# Patient Record
(demographics unavailable — no encounter records)

---

## 2024-10-15 NOTE — REASON FOR VISIT
[FreeTextEntry1] :   [phone] Mom cell  I had the pleasure of meeting with Naila Souza  and her parents, for a follow up appointment.  Introduction:  Initial Consultation Assessment (2020): Naila Souza presented for consultation for concerns for Autism and ADHD. This consultation was performed by phone given COVID-19.  *Autism concerns: The following is reported: -reduced eye contact, reduced response to name -speech delay -reduced shared enjoyment and joint attention -repetitively may watch part of a video -perserverates -some compulsive tendencies - everything has its place, upset if something isn't in its place -unusual visual regard -spun herself around when younger, 'laps' done around the home -holds 2 dolls with her much of the time she is at home -scripts -somewhat of a social interest but struggles with cooperative play -social pragmatic weaknesses -transition difficulties -self-directed  *ADHD: -impulsive -easily distracted -active  *Academics: -below grade level -reportedly past attempts at cognitive assessments were not successful  Overall Naila's presentation is consistent with the following:  *Autism Spectrum Disorder (requiring substantial support) (given that this consultation was not performed in person, a definitive diagnosis will be given during a follow up visit, but we discussed that by report her presentation is consistent with Autism)  *ADHD-Combined presentation. We discussed that in a self-directed individual, self-direction vs. inattention often can be difficult to decipher and that this requires continued monitoring, but a diagnosis is given at this time.  *Neurodevelopmental Disorder: this is reflective of her learning challenges and suspect cognitive challenges, in the absence of robust cognitive testing. Her learning profile and cognitive abilities require continued monitoring and evaluation overtime. Currently her cognitive abilities are unknown.  INTERIM HISTORY:  *2024: __Started Camp program [employer] program at [facility] [place] [facility] (had been there for 7 summers)- did well first few days - last few days - aggressive, pushing, swatting. -were going to try half -days but still struggling -parents have removed her from camp __At home: -no major change, fair behavior at home for Naila although some worsening challenges w/ routines, becoming more of a process - might just sit and ignore, and not become aggressive -Sister left a couple of days prior to behavioral issues worsening -in general ended off school yr and began camp in a good 'zone'  *2024: -stopped camp -returned to ESY program - but then had COVID and ESY almost complete -Returned to [facility] respite program -trialed FXR -back into mode of sleeping late -fair behavior at Respite and ESY, better than in camp  TODAY:  *Endo (2023): -mood changes with menses - can consider seeing a gynecologist to manage -normal A1C, do not have concerns regarding early Type 1 diabetes, antibodies for Type 1 DM negative -should not be at increased risk of DM type 1 on top of fmhx -f/u as needed  Menstrual periods have become more regular since 2024 (first period 2022)and this has been helpful with behavior. Transitioned to MS fairly  Current Regimen: -Prozac 20mg - increased from 15 around Sept -Guanfacine ER 4mg at night - around 6:30pm - no impact on sleep -Guanfacine 1mg prior to bed (on hold) -MPH 2.5mg (started around beginning 2024, later stopped, restarted when began struggling in camp)- stopped summer 2024 -FXR 5mg: only for a few days and a lot of other changes correlating. No major benefit. No major SE - 10/2024 - haven't retrialed     *MPH 2.5mg: __2024: -take in morning -seems to keep her awake more in school -and seems calmer in general __10/2024: -perhaps tried up to 5mg, didn't try long enough to get a sense of response  *Guanfacine 1mg: __2024: -had been trialing to help with sleep, seemed to help but noted increase in tic like behavior so stopped it -tic like beh was squinting her eyes, hand over eyes and shaking fingers, repetitive exp noises - at times can interrupt it, other times got stuck on it and didn't want to be disturbed -seems to have decreased in frequency overtime __10/2024: -havent retried prior to bed  *Fluoxetine 10mg: -benefit noted -less scripting, less defiance, speech is clearer, more engaged when speech with her, quieter, calmer, more accepting of being redirected -more focused and engaged with activities __2023: -trialed 15mg but then returned to 10mg as noted tic like behavior and worried might be related __2023: -retrying 15mg - no major difference __2023: -unclear level of benefit, likely somewhat beneficial -correlated with recent increase in constant motion - in past has also had similar behavior but more recently more prominent - unclear if SE of the medication __2024: -tried weaning off down from 35, got down to 10mg but more emotional dysregulation, now on 15mg and seems to be fair. On 35mg more 'in a fog' -benefit with 15mg of Prozac __2024: -15mg __10/2024: -In Sept increased to 20mg  *Guanfacine ER 1mg: -continued outbursts and behavioral issues -Few days ago (as of 22) increased to 2mg - calmer, happier, more engaged - doing better at [employer] -still rigid -towards afternoon more impulsive, more impulsive eating -last night (22) diff falling asleep/woke up during the night __2022: -taking Guanfacine ER 2mg at night -less defiance, less outbursts __2023: -continuing 2mg nightly __2023: -after last visit increased to 3mg - unclear if any major difference __2024: -Guanfacine ER 3mg - tried going down to 2mg but worse sleep, so returned to 3mg __2024: -3mg _: -recently increased to 4mg - no major SE and no increased benefit  *Involuntary movements and vocalizations: __Neck twitch: a few beats of ear to shoulder - can happen when watching video, more common if scripting -more common since sick mid 2022 -a few times a day -doesn't seem to bother her although school thinks perhaps uncomfortable for her -no major pattern -wax and wane - school noticing more __2023: -shuffling gait -happening throughout the day, a few minutes at a time, variable - jerky movements, intermittent vocalizations and shuffling gait - more common if not busy, more common when sitting around or watching video -behaviors impact her from doing behaviors she wants to participate in or even eating -taking longer to eat due to the jerking/vocalizations -behaviors don't seem to bother her clearly although parents question if the grimacing is an expression of discomfort __2023: -significant decrease in these behaviors - evaluated by Neuro __2024: -involuntary movements have significantly decreased -shuffling gait has stopped -walks more fluently but covers her eyes __2024: -tic like beh was squinting her eyes, hand over eyes and shaking fingers, repetitive exp noises - at times can interrupt it, other times got stuck on it and didn't want to be disturbed -seems to have decreased in frequency overtime   Communication: __2024: -may verbally protest -often speech not very clear -Speech very clear when Ipad not working and asking for assistance  *Rigidity/rituals/atypical behaviors/RRB: -variable, inconsistent -very rigid how she does things and how she places things -watches video clips repetitively -arranges dolls in particular fashion -set rituals __2024: -cycles of stereotyped behaviors -vocalizing and gently putting finger tips to right eye -vocalizing no no no no -periodic episodes of internal scripting  *Behavior: -zoning out more -more episodes of aggression -more scripting -less engaged -noted at home and in school -more defiance -harder to focus and redirect -Teachers reportedly note (2021): easily distracted -internal/external, scripting throughout the day, many ritualistic behaviors, if changes in routine -defiant, aggressive, property destruction, visual cues seems to help redirect her, throughout the school day, needs token economy system, behaviors interfering her progress, working on group skills. When focused makes more progress. Academically behind. Messy handwriting. Needs a lot of repetition -Home: does laps around the house, was cooperative during recent physical, scripting, directable at times, at times gets more emotional - gets frustrated at times but short lived. Talks under her breath/in a whisper. Slow w/ morning routine, but tries following routine. __2022: -worse behavior with Vyvanse __3/ School: -still diff focusing -fewer outbursts but still present -body jerking, vocal grunting - hard to get through -jerky movements - parent hasn't generally seen -giving more rewards more frequently -more snacks for sensory, more movement breaks __2022: -some improvement over recent past __2022: -certain sequencing she needs to complete - walk, stop, walk back, go forward again -internal and external scripting - in her own world, may not always respond -during scripting may get louder, more emotional, hard to calm down -Camp: had been in an inclusion group prior years with aide for SPED campers -but this year more problematic, needs more supports-- --now in a self-contained group __2022: -some improvement w/ Intuniv 2mg __2022: -some improvement w/ Intuniv and Prozac although some continued physical resistance in school and at times at home __2023: -continued scripting -may physically indicate 'no' 'go away' -if frustrated scripting becomes louder -overall behavior has been fair/manageable __2023: -increased impulsivity - got flour everywhere in kitchen tried doing her old laundry - at times impulsivity related to her wanting to be independent -scripting: becoming more internal -calm moments, more energetic moments __2023: -continued significant challenges with 'constant motion' (waxes and wanes, was present prior to SSRI trials as well, but has been worsening), internal and external scripting interrupting her fxing and related inattention, behavioral outbursts, aggressive behaviors (more common when around her period) -Constant motion - pacing, circles room -Gets stuck scripting/yelling/stimming - with time usually calms after 20-30 minutes - AT home more manageable. At camp/school more challenging to give her time/space. -program in her head she needs to go through before can move on - like in a trance Camp: -some screaming episodes 20-30 minutes, at times aggressive - may push/swat others __2024: -overall behavior has been better than the past, although persist being very challenging *School: -no recent aggressive episodes -some ritualistic behaviors -sleep is largest issues __2024: -participating more in class -no recent aggressive behaviors -less sleeping in class - sometimes seems to be task avoidant at times __10/2024: -Becoming somewhat more aggressive, may swat peers or staff -Respite program: needs time to deescalate, may need to remove from peers when gets upset -Ipad: -stuck on having it with her all the time -in school has to work for the Ipad - perseverates (during visit if took time loading increasing became upset, yelling -'its NOT working!!!!' and repeatedly tapping it, and hard to console)   *Sleep: -since Dec worsening/less consistent sleep -diff settling down for sleep - wouldn't fall asleep until 1 am -some nights asleep ~9pm -Wakes: if goes to sleep later diff to wake in the morning wants to sleep until 11am, in general if goes to sleep regular time wakes around 6 -2 instances of bedwetting recently - although may have been attention seeking - was awake trying to fall asleep -Melatonin 5mg - unclear if it helps -Most nights 1-2 nights up a little later than in general __2023: -more diff going to sleep, wants to stay up and play __2023: -prior to sleep issues ?related to period perhaps - some nights asleep around 9 and some nights around 1am - worsened during period __2023: Sleep improved as more active over summer - off and on as well __2024: -bedtime routine around 9:30/10pm in bed - routine takes time as she wants to do things in her own time/way - process takes a long time. Transitions between things are very time consuming -Majority of wk not asleep until Midnight to 1-2:30AM. Most mornings have to wake her around 7am, other times she wakes on her own earlier and then takes a nap for 3 hours in school. On wknds may sleep until 10-11am. - Due to slow going in morning often parent needs to bring her to school and she gets to school late -Stopped melatonin - no major differences __2024: -likes sleeping late - sometimes impacts her getting to school on time -sleep times vary __10/2024: -hard to get up in the morning -falling asleep around MN most night, sometimes 3am, waking 9-10 am and getting to school late, hard to get up in the bed   *Social: -enjoys being around her peers -takes more to engage her than in the past  *Anxiousness: -when on Zoloft somewhat calmer -a little more engaged -seems more overwhelmed at times, talking in a low tone, reduced EC -seems to be more of an issue recently __2022: -some improvement with Prozac  *Staring off: (2022) In School: There have also been instances where she stares off to the side. Her eyes move to the side and remain fixed and she does not blink. Additionally, she does not respond to or track visual stimuli brought near her such as a waving hand. Home: -havent noticed recently __2022: none recent  Private services: -began working with  retired [employer] program - has been helpful -music therapy - stopped -Private SLT - still attends -Respite services  *School input (24): We've noticed an increase in "no please!" which she uses to cover a lot of "annoying to her topics" - it's her way of saying "be quiet!", "too close", "I don't like that!" etc. She has been more willing to be redirected to a more specific statement that targets the external activity she doesn't like.  We've seen a great decrease in aggression - 1 swat on  and also she threw something on the floor. We have not seen anything else in terms of aggression in months!!!!!!!! Such great progress!  We notice a bit more of ritualistic behaviors the week before she gets her period. More singing, more "dancing in her seat" kind of thing, but nowhere near the amount she was doing in the beginning of the year. She transitions beautifully in the hallway and to classes. Biggest issue we have is the sleeping, but other than that she is an absolute delight! ***  SCHOOL HISTORY/DEVELOPMENTAL SERVICES:  Academics: -below grade level, closer to K level, starting to read, 12-15 sight words, can do basic addition -hard to engage to sit and work with her  *Report Card ([id number], Grade 1): -mostly 1s and 2s  School: [employer] Grade: 7th Services: -8:1:3 -SLT -OT -Social group  Past school: School Name: [employer] Grade: 5th Services: *IEP -Classification: ASD -8:1:3 -SLT: individual and small group -OT -Social group -perhaps some mainstreaming -Chorus - likely w/ an aide -Behavior intervention services - [employer] involved in her programming - monitoring and tracking her behavior in class-([facility] trained therapist/teacher in place/[facility]) -Parent training/counseling -ESY (although attended a summer camp program which she did well in ) -NO testing accommodations Comments: -demonstrates global developmental delays impacting academic progress -thrives on classroom routine -does well with rote questions -requires focusing prompts to answer questions in group -working with her on what quiet means and will incorporate times that acceptable to script and times were it isn't -responding to social greetings and farewells are emerging with visual support and models -establishing eye contact is an ongoing goal -needs consistent modeling and prompting to interact with her peers during social times  __ 23: School report: Naila arrived at school today calm. Around 8:50 we began transitioning down the hallway to go to the bus for our middle school visit. She began to engage in negative vocalizations that quickly turned into screams. She then laid on the bench in the front carl and continued to scream and aggress toward anyone that came in reach of her. We were able to get her calm enough to comply with standing and walking. We began slowly walking down the carl with many stops along the way (I will describe this behavior below). She continued to scream during the walk but was not aggressing. Once we got to the turn in the hallway, the principal joined us. She reminded Jd that she is okay and we are all here for her. Naila began to escalate in her screams and begin to aggress. Most of the aggression at this point was towards the principal but would switch to any person who tried to block or redirect her. The aggression included swats, hits, pushes and kicks. She was so escalated we needed to help her walk into the OT room to deescalate. Once there, she laid on the floor and we got her a pillow and a blanket. She was crying so hard she was no longer making sounds. We shut off the lights and positioned ourselves close enough to her, so she knew we were there to support her but far enough away to give her the space she needed to come back down. Once semi quiet, she earned the iPad and watched it for about 6 minutes. The timer then went off and she earned tokens for completing instructions. She was able to sit up, stand up, sit in a chair and walk to class where she earned the iPad again. From this time until approximately noon (about 2 hours), Naila was on edge with some screams and aggression but was able to complete some work. Unfortunately, around noon she began to escalate again. This episode took place in the classroom and she sat on top of her desk which was unsafe. We directed her into the work room in the back of the classroom where it was quiet and also where she would not disturb her classmates. At this point the magnitude of the aggression was very high. Kailyn and SHAHZAD were in the room with her with her token board and reinforcement. We tried a variety of methods, reinforcing moments of quiet, telling her she is safe and we are here to support her, ignoring behaviors, blocking and redirecting, as well as, showing her the reinforcement and providing almost immediate access for any type of compliance. None of these methods were successful. She aggressed continuously for almost 50 minutes. For reference, I was able to take 1 minute of frequency data and there were 46 aggressions in one minute. Aggression included hits to our bodies and faces, kicks to our bodies and wrapping her arms around our necks and slamming her body into us. At one point, we stepped out of the room to give her space and she continued to hit the walls and screaming while looking at us. She also went through a period of hugging me in a consoling way then stepping back and hitting me in the face then returning to hugging. This sequence happened 6 or 7 times. Eventually, we were able to guide her out of the room and back to her table where she was able to earn reinforcement and transition to lunch. The transition took about 26 minutes, and she did not make it to the lunchroom. Instead, in order to ensure she got to eat, we stopped in a one of the teacher's quiet small office spaces which [facility] Once inside she was able to sit calmly and eat her lunch. After lunch, iPad was terminated, and she began to work and earn on her regular schedule. It was then time to transition to chorus, she made it to approximately the OT room (similar distance as the previous transition) in about 23 minutes. It was at that point that we realized we needed to turn back or she would not make it back to class in time for dismissal. We return walking with her continuously stopping and scripting. Once we were in view of that same quiet small office space the TA pointed and said "Jd, look we are going back to where you ate lunch" and she very quickly walked straight to the room and sat down. She was then able to sit and work then complete her homework schedule. She walked to the classroom packed up and went outside with a positive affect. Kailyn spoke to mom and came back into the school. I saw people at the door and heard Jd yelling. I ran down and went outside. Kailyn notated that she was walking to the front of the building and saw her starting to engage in behavior so she went back. Upon my arrival there were 4 staff there and mom monitoring the street due to  time and her safety. Kailyn was blocking her head as she was laying on the cement. aKlina, classroom SLP, ran inside to get a bean bag to put under her head, again for safety purposes. We directed the other staff to stand out of sight but to continue to monitor the road and people walking by. She eventually stood up and with many prompts walked to the car which was parked in the back of the building. The whole process took about 45 minutes. The scripting behavior I am referring to throughout the summary is a newer behavior. It is a specific script where she stops, puts her hands to her mouth likes she is yelling and produces an unintelligible script, lifts her hands above her head as if she is yelling then on her hips like she is angry. She then spins around and looks at the floor. Sometimes she will complete this one time and continue walking/ working. Other times she has to complete this 6 or 7 times in a row before getting started.  *School note (2023): -recently demonstrating significant increase in vocalizations/involuntary movements leading to a loss of fx in school -for past 2 months these have been debilitating for Naila -do not appear behavioral or sensory in nature - sensory/behavioral plans have not been successful -behaviors occurring in all settings/all teachers, preferred and non-preferred activities, resting times = do not appear behavioral or self-stim in nature -she tries to work through these involuntary mvments to complete her work or participate in activities she enjoys and she cannot - gets frustrated if times runs out b/c wanted to finish preferred task but body isn't letting her -quick head tilts, shoulder shrugs, freezing during a mvmt, shuffling gait, screaming, vocalizations, kicking, muscle twitches - data shows occurring every 3-20 seconds -diff with FM tasks, diff eating/opening packages, cant participate in gym activities she could in the past, diff participate in new learning activities -issues since ~January  *22 Behavioral Consultant provided info Overall, there has been a decrease in the upset/"emotional" episodes that typically included aggression, property destruction and screaming. The week before she was sick there was an increase in screaming and aggression. There is the possibility that it may have been due to not feeling well ahead of testing positive. She continues to engage in high rates of self-stimulatory behaviors and non-contextual vocalizations that interrupt her ability to engage in academic tasks. These behaviors occur throughout the day and occur during leisure and preferred activities as well. They are not restricted to times of academic tasks or non-preferred demands. We continue to see non-compliance during times of academic tasks and demands. These can present as task avoidance, but also includes times when we have difficulty refocusing and redirecting her back to tasks. There are times that she appears as "stuck" within her self-stimulatory and scripting behaviors and does not appear aware of her surroundings or directions being given. When she presents as more "stuck" her motor movements appear to be brief and jerky (i.e. a quick extension and retraction of her arm out in front of her) and her vocalizations are generally more sound or grunt based versus intelligible words or tv scripts. There have also been instances where she stares off to the side. Her eyes move to the side and remain fixed and she does not blink. Additionally, she does not respond to or track visual stimuli brought near her such as a waving hand. please let up know if there's any additional information or anything we can provide more examples of or elaborate on  In past: Goes into 12:1 for a morning meeting, and mainstreamed for some activities Does well with structured routine. Fairly independent in some settings. Mainstreamed largely for socialization opportunities. Some mainstreaming with an aide to help with games, sharing and conversation - -may need prompts to respond or initiate a greeting.  : School Name: [employer] through [employer], 2 years Grade:  Services: -1st year in  - 10 students -2nd year in  - 8:1  *Behavioral Consult Progress report [facility] for Autism ([id number], 2019): -improving ability to tolerate change in routine, redirect, and interrupt preferred play -working on working quietly   *[facility] Speech/Language Hearing clinic (treatment plan 6:6-6:8yo yo, ): -working on functional communication, play skills, Pragmatic skills, answering 'wh' questions  *Private Services: -speech therapy at [facility] intermittently -Music therapy -Havent pursued private CHASITY services  *Teacher intake form (2021, 3rd grade): -Self-contained, OT, SLT -requires token economy to keep motivated and on task -seatwork: needs teacher prompt, w/o support work is illegible or incomplete -Academics: failing -Reading: = K level, Math = K level -Social: doesn't seek peers/adult relationships, will sit and parallel play but requires a lot of teacher support to interact appropriately  *Teacher intake form (3/2020): -self-contained, 7 students, multiple other staff, K-2 class, in 2nd grade -SLT -OT -Beh intervention services -highly benefits from CHASITY / discrete trials -thrives with routines, follows most directions -difficulty with changes in routine, generalizing skills, constant refocusing prompts -token board for motivation and on task behavior -Social: peers from Gen ed class seek her out, her interactions with these children are adult led, most of the time she requires prompting to respond back/engage appropriately -recently developed a sense of humor -Academics: below average - 2 or more yrs below grade level -Reading: not retaining learned words or generalizing words across difft materials -Math: K level -Writing: cant spell without visuals, needs some guides to stay on the line -Seatwork: inconsistent - at times needs significant prompting - other days - still requires prompting but not as much   PREVIOUS ASSESSMENTS/SERVICES:  Early Intervention: no services  Private speech therapy around 2 years old.  Earlier evaluations: reportedly wasn't classified as having autism  CURRENT FUNCTIONING/HISTORY OF PRESENTING CONCERNS:  *********AS noted during her initial consultation****   Concerns noted on our intake paperwork include: -poor speech/communication and social skills -makes limited eye contact -bright, comprehends more than she says verbally -difficulty interacting with others- jonathan. verbally - likes to be around other children but mostly parallel plays or needs prompting to interact verbally -she understands much more receptively -she can give appropriate responses with visual and verbal cues -lacks patience and can become frustrated when things don't go her way -responds best to routines/structure  Today's concerns: -autism concerns -ADHD concerns  Concerns: -limited eye contact -difficulty socializing -repetitive movements -perseverations -some compulsive behaviors -everything has a place - need to move something back if not in its own place-table needs to be placed in an exact manner -puts toys exactly / same position and location as she found it   Language/Communication: -Shared enjoyment/joint attention: likely decreased -turns to others for help -Response to name: better now than in the past, reduced in the past -Eye contact: good with requests from her, other times reduced or needs prompts -able to follow multiple step instructions -able to speak in sentences - receptive seems stronger than her expressive language, sometimes speaks in clear sentences, other times less well developed -often scripting  Behavioral: -ADHD: a number of concerns, impulsive, highly distractible, may need multiple prompts -frustrated easily at times -improving with sharing and transitions -still issues with calming when upset, fewer meltdowns than in the past  Emotional: -anxiousness/resistance at doctor's office.  Social/Play Skills: -scripts frequently when playing -play somewhat appropriate - demonstrates pretend play with play dolls- has 2 dolls in her arms or near her often when she is home (not in school) -some appropriate play with toys at home -demonstrates some pretend play with kitchen set -difficulty taking turns - timer seems to help -some social interest in peers - on her terms -very connected to her older sister - responds well to Soheila her older sister - somewhat of a jokester and can  on jokes quicker than her older sister  Atypical Behaviors/Sensory: -hand flapping: rarely -in past: spun herself around in circles -walks around in circles/laps around the house -may watch same scene of a video repetitively - may want parent to repeat something she says in regards to the video - may repeat saying it until validated -Focus on wheels: no -no toe walking -stands close to TV -difficulties with changes in routine and transition issues -some visual peering/unusual visual regard - usually with TV -generally no visual inspection -play can often be in a set pattern - can get upset if someone wants to play in a different manner -may chew banana and then spit it out into garbage due to texture issues -tactile: sensory seeking - when watching TV may touch the screen, no major issues with clothing, likes deep hugs when upset, often makes use of sensory room in school -may repetitively watch the same show in the same way -often does 'laps' around the house - jonathan. when anticipating something -Loud noise: generally no issue  Motor Abilities: -fair  ***********   *MEDICAL HISTORY:  Current Medications: -Intuniv 4mg at night -Prozac 20g -Guanfacine 1 mg at night - on hold -MPH 2.5mg in AM - stopped -Claritin prn  Medication History: - swallows  *GIOVANNY 10 mg (2020-~2021): -10mg - unclear duration / benefit/SE -~2021 - trialed 20mg - unclear effects/SE - correlated with change in her usual routine. Seemed that worsened sleep, perhaps made her less interested in food, perhaps more scripting/rituals and more common meltdowns jonathan in afternoon around time medication thought to perhaps be wearing off --- although behavioral issues also noted on days that wasn't taking the medication so unclear if she truly failed GIOVANNY/stimulants - may trial again in future. Perhaps was quieter at times in school w/ medication  *Guanfacine 1mg (21 -2022, ~2022 restarted and later STOPPED 2022, 2024- ): -to trial pill 3 X a day -2021: 1mg BID - clear benefit noted, calmer, more cooperative, more engaged -2021: to trial small increases -2022: stopped, and shortly after increased Lexapro to 7.5mg - more active, impulsive, eating more, some more diff falling asleep -2022: restarted and later stopped -~2022-2023 - trial 1mg before bed as sleep aide, didn't help - stopped -2024: to retry as sleep aide -2024: helped with sleep 1mg at night -but parent concerned seem to correlate w/ new stim/tic like behavior and stopped, might retray  *Zoloft 25 mg tab (2021 -10/2021 stopped): -seemed somewhat zoned out, at times somewhat sleepy in class/drift off, napped when usually didn't nap -once stopped Zoloft - more scripting, more circling around, more repeating, repetitive, scripts the same story, moving hands in set way, gets emotional at times and yells out - tab  *Concerta 18mg (~-10/2021 -for a few days and stopped, trialed 3 days - can consider trialing again at some point): -more meltdowns, on and off throughout the day, emotionally reactive/less easily redirectible -aggressive, yelling, agitated, hit, more physical -Duration: lasted most of the day, less than 12 hours  *Vyvanse 10mg (2021 -2022): -more outbursts, throughout day but mostly as wearing off -lasted a few hours, somewhat calmer -more scripting, more distracted by internal stimuli -Sleep: no impact -Diet: slight impact - less snacking  *Lexapro (2022-2022): -to trial to address outbursts, scripting, and behavioral challenges -2022: trialing 7.5mg - some decrease in internal scripting noted, seems happy -3/2022: increased to 10mg -2022: trial increase to 15mg -2022:-weaned off, as weaned off more impulsive behavior, more scripting, increased appetite, more rigid. Parents stopped as had continued with emotional outbursts - meltdowns in school, internal and external scripting. Not helpful enough and continued outbursts.   *AXR 5mg(3/2022-~2022): - unclear impact, some diff with sleep but manageable, perhaps fewer outbursts -3/31/22: trialed increase to 10mg - 'seemed off' and returned to 5mg -2022: trialed 10mg - more outbursts and scripting - stopped.   *Guanfacine ER (2022-): -2022: taking 2mg with noted benefit __2023: 3mg nightly __2024: tried going down to 2mg but worse sleep, so returned to 3mg __10/2024: recently increase to 4mg - no increase in benefit or SE  *Prozac 10mg (~2022-): -benefit noted -more engaged, less scripting, calmer -2023: trialed to 15mg - but noted tics and returned to 10mg. Benefit noted (discussed tics likely unrelated) -2023: parent increased to 15mg again -2023: 30mg daily -2024: reduced to 15mg - at 15mg beneficial, at 10mg insufficient benefit -2024: 15mg __10/2024: recent increase to 20mg   *MPH 2.5mg (~2024-): -seems to be helping her stay awake in school  *FXR 5 (2024-stopped after few days): -tried for about one wk -no major difference -perhaps scripting more   Allergies: -none  UPDATED PAST MEDICAL HISTORY: There have been no recent major medical changes.  Birth History: -born FT, 6.13lbs -regular delivery  ROS: A 10-point review of systems was performed. No concerns regarding vision and hearing. No cardiovascular, respiratory, gastrointestinal, renal, endocrine, neurologic, musculoskeletal, or dermatologic concerns.  *[place] evaluation Dr. Wilver William - Chief developmental pediatrics: worked on ways to address her behavior: reportedly impression was that Naila didn't have autism.  Summer 2020: had episode of passing out - hospitalized and evaluated by Cardiology and Neurology. Neurology considering/ordered brain MRI. No seizure activity found  *20: -Reviewed inpatient Cardiac Consultation note (hospitalized following episode of syncope) *Cardiology (2020, Vee Huff and Elida Golden): -no cardiac contraindications for starting ADHD medication  Neurology (): -EEG performed -Tic like behaviors have improved/lessoned -MRI normal  Audiology (2-3 yo, Dr. Osborn): hearing thought to be normal, MT placed  Vision: none  Hospitalizations/ Surgeries: -MT placed   FAMILY HISTORY: Her father has a MA and is an . He received Speech services when younger. Generally healthy. Her mother has a MA and is a . She received Speech services when younger. Has type II diabetes - perhaps Type 1. Thyroid issues. She has a sister (Soheila, 2007). Soheila is also followed in our center  There is an extended family history of: -ADHD, learning problems, speech delay, borderline range of intellectual functioning: sister -Bipolar: Paternal uncles, PGM -Schizoaffective: Paternal uncle, lives in a special group home/residential program, also "simple-minded" - he reportedly trialed Prozac and it made him psychotic - requiring hospitalization - improved once stopped -Some easy distraction: father -type II diabetes: maternal side of family -heart murmur: mother, father, and other family members -Fatal Cardiac arrest: uncle, was living in adult independent residence, 48 yo  There is no history of heart rhythm problems or of sudden death.  SOCIAL HISTORY: -lives with parents and sister  PE (23 in person): -well appearing -S1S2 RRR -CTA b/l -FROM of extremities -no consistent muscle rigidity, no clear cogwheel rigidity -no clonus appreciated -fair tone and strength -EOM appeared intact __10/2024: -S1S2 RRR  Observations:  (Tele 21): -calm during visit, frequent verbalizations to herself, holding her hand up with perhaps visual inspection, stopped and greeted me with prompt, pacing around the home, cooperative (Tele 3/31/22): -calmer and more cooperative appearing - mother called her over and she complied, and when prompting said hello with associated EC (in person 2023): -frequent almost barking like vocalizations, and other noises while in waiting room - less frequent but still occurring intermittently during 70 min time in room -neck twitching, shoulder to ear, leg movements, face grimacing - also myoclonic in appearance - also occurred more freq in waiting and less common when involved w/ activity in office -intermittent shuffling gait/pause when walking and then continue walking more fluidly -when walking from waiting room, in office able to walk back and forth across room when requested fluidly -mostly calm during visit and involved in playing with a bin of toys - involuntary movements and vocalizations significantly decreased for a good portion of the visit - for around ~40 minutes few if any were noted, but as visit progressed were some episodes of these movements and vocalizations - usually lasting less than 30 seconds -crayons fell on floor - she wanted to pick them up and put back on table -one toy that was missed when had put away toy bin - despite redirection she went around doctor to put back in bin - seemed driven to do so -answered some questions - yes/no when given options of what to play with -cooperative with vital signs and exam ( 2024): -social rapport not established -intermittently gently touching eye with fingers and vocalizing, intermittent repetitively saying no -self-directed   * LABORATORY/TEST RESULTS/DEVELOPMENTAL ASSESSMENTS:  ***Testing as recorded in her IEP: (2018): __Goldman Fristoe Test of Articulation 3: -Sounds in Words: 69 __Expressive One Word Picture Vocabulary Test-4 (EOWPVT-4) (SS): 14 % __Receptive One Word Picture Vocabulary Test-4 (ROWPVT-4) (SS): 90 __Preschool Language Scale-5 (SS)(SD of 15): -Auditory Comp: 67 -Expressive Comm: 64 -Total Language: 63 __ABAS (Adaptive Behavior Assessment System-III) - Teacher: -General Adaptive Composite: 1 % -Agyle Chuy Tests of Achievement IV (SS) (SD = 15): -Many scores noted as "Not scorable" -Letter Word Identification: 84 -Passage Comprehension: 45 -Reading : 65 -Broad Readin -Basic Reading Skills: 82  -Applied Problems: 51  -Spellin -Broad Written Language: 58 -Basic Writing skills: 84 -Written Expression: 65  Cognitive assessment: reportedly attempted in the past but unable to complete testing secondary to behavior.  *Psychology Eval (2.10 yr): -difficult to engage -very short attention span -looked at herself in mirror for 3 minutes -difficulty cooperating with requests -inconsistent eye contact -often ignored those around her and needed support to engage and attend -FSIQ could not be obtained, non-compliant   (3/2020) __Child and Adolescent Symptom Inventory-5(DAVID-5):  Informant: Parent -inattention:  -hyperactivity/impulsivity: 3/9 -oppositional and defiant behaviors:  -feels compelled to perform unusual habits: often -makes vocal sounds for no apparent reason: often -social deficits/restricted and repetitive interests/behaviors: a number endorsed No other significant symptoms of any emotional or behavioral disorder  Informant: Teacher -inattention:  -hyperactivity/impulsivity:  -oppositional and defiant behaviors:  -Very often: -feels compelled to perform unusual habits, rocks, makes vocal sounds for no apparent reason -social deficits/restricted and repetitive interests/behaviors: many endorsed No other significant symptoms of any emotional or behavioral disorder Comments: -overall pleasure to have in class, happy girl -has been presenting with more anxiety compared to last year -repetitive movements - rocking, vocalizations, scripting - and rigidity - often impede her ability to learning and retain info, as well as generalize skills -thrives on routine/structure - if routine changed - can have tantrum and be difficult to redirect - screaming, crying, some staff aggression  [place] Assessment  -Informant: Caregiver on Intake Form -Inattention:  -Hyperactivity/Impulsivity:    (2021) Guanfacine 1mg BID [place] Assessment: -Informant: Teacher -Inattention:  -Hyperactivity/Impulsivity: 3/9 -Academics: problematic.

## 2024-10-15 NOTE — PLAN
[FreeTextEntry3] : 1. Follow up is scheduled in ~1 month  2. Medication:  *CURRENT: -Intuniv: Current 4 mg - return to 3mg as no increased benefit reported on 4mg -(previously discussed  to trial splitting into 2mg in the PM and 1mg in the AM in hope that will be less sleepy in the morning and more willing to get up for school - don't believe parents tried this) -Fluoxetine 20mg: continue -try increase to 30mg and then after 2 wks if needed/tolerating to 40mg. If fails to wean and consider trialing alternate such as Zoloft -Short acting Guanfacine : can retry 0.5-1mg at bedtime to see if assists w/ sleep initiation - unlikely related to ~4/2024 recent new tic/stim like behavior -MPH 2.5mg in AM - Currently on HOLD - in past seemed to help her stay awake in school. Can continue trial and can trial increases to 5mg or BID dosing if needed/helpful -FXR 5mg: Previously noted can retry in future once back to usual routine - can trial increase in 5mg depending on response/SE. Monitor scripting. If unavailable trial Quill[place]w ER if covered. Target: impulsive behaviors/ADHD sxs/behavioral issues - Parents haven't retried -ABILIFY - 10/2024 -discussed Abilify indicated and is an option. Parents hesitant and not interested in a trial at this time. Of note fmhx of Diabetes (Naila has been evaluated by Endo) and hx of atypical movements while not on atypical although these movements have decreased significantly recently  Additional options -Discussed addtl options such as using MPH short acting once or BID, trialing increase in Intuniv, trialing Abilify. As has generally been doing better wont trial Abilify at this time.  1/2024: -evaluated by Endo (Note - PMD provided small dose of Xanax to help with blood draw, parents tried [address] and at [facility] ph[facility] w/ poor experiences)  1/2024: -discussed trial of Abilify can be considered overtime and may target a number of Naila's challenging behaviors -While fmhx of DM does per her at increased risk with atypicals, recent Endo labwork is reassuring to a degree -if atypicals started may be challenging monitoring for atypical movements as a SE  Previously has discussed that Child Psychiatry consultation and ongoing care (tasked SW to assist - direct referral to [facility] if needed) may be beneficial. 1/2024: is doing better currently than in the past __10/2024: -[facility] info provided. Direct referral to [facility] being made  3. OPWDD: submitting application  4. Home CHASITY: discussed looking into  We discussed effects/SE of: - Stimulants (for her behavior and ADHD) -Alpha agonists (for behavior and ADHD) -SSRI's (for her anxiousness, rigidness, behavior, easy frustration, rituals, scripting).  [email or web address]_[email or web address]/[email or web address]  [email or web address] - ADHD  5. Sleep: __1/2024: -significant challenges - to trial short acting guanfacine as sleep aide -recc scheduled in Sleep clinic eval in case needed __10/2024: -if upcoming med changes aren't helpful Sleep medicine consult can be considered  Previous Recommendations: *Autism reccs *ADHD reccs *Social skills groups  It was a pleasure to work with Naila and her family today. Please do not hesitate to contact Dr. Vasques at [phone] with any other further questions or concerns.

## 2024-11-14 NOTE — PLAN
[FreeTextEntry3] : 1. Follow up is scheduled in a few wks  2. Medication:  *CURRENT: -Intuniv: Current 3 mg -continue (no increased benefit on past trial of 4mg) -Fluoxetine 30mg: continue. If needed consider increase to 40mg. If fails to wean and consider trialing alternate such as Zoloft -Short acting Guanfacine: Continue 1mg prior to bed for sleep initiation -MPH 2.5mg in AM: recc retrialing given Motivation/slow going issue. Give first thing in morning. Can consider using multiple times daily if good response to retrialing XR -FXR 5mg: Previously noted can retry in future once back to usual routine - can trial increase in 5mg depending on response/SE. Monitor scripting. If unavailable trial Quillichew ER if covered. Target: impulsive behaviors/ADHD sxs/behavioral issues - Parents haven't retried -ABILIFY - 10/2024 -discussed Abilify indicated and is an option. Parents hesitant and not interested in a trial at this time. Of note fmhx of Diabetes (Naila has been evaluated by Endo) and hx of atypical movements while not on atypical although these movements have decreased significantly recently  Additional options -Discussed addtl options such as using MPH short acting once or BID, trialing increase in Intuniv, trialing Abilify. As has generally been doing better wont trial Abilify at this time.  1/2024: -evaluated by Endo (Note - PMD provided small dose of Xanax to help with blood draw, parents tried [address] and at [facility] phlebot[facility] w/ poor experiences)  1/2024: -discussed trial of Abilify can be considered overtime and may target a number of Naila's challenging behaviors -While fmhx of DM does per her at increased risk with atypicals, recent Endo labwork is reassuring to a degree -if atypicals started may be challenging monitoring for atypical movements as a SE  Previously has discussed that Child Psychiatry consultation and ongoing care (tasked SW to assist - direct referral to [facility] if needed) may be beneficial. 1/2024: is doing better currently than in the past __10/2024: -[facility]. Direct referral to [facility] being made __11/2024:  -direct referral offered. Parent declined at this time  3. OPWDD: submitting application  4. Home CHASITY: discussed looking into  We discussed effects/SE of: - Stimulants (for her behavior and ADHD) -Alpha agonists (for behavior and ADHD) -SSRI's (for her anxiousness, rigidness, behavior, easy frustration, rituals, scripting). [email or web address]_Themes/[email or web address]/Docs/resource_[email or web address]/[email or web address]/Autism_Spectrum_[email or web address]_Parents_Medication_Guide.[email or web address] - ADHD  5. Sleep: __1/2024: -significant challenges - to trial short acting guanfacine as sleep aide -recc scheduled in Sleep clinic eval in case needed __11/2024:  -discussed shifting bedtime as tolerated slowly earlier hopefully leading to waking earlier and getting to school earlier -if upcoming med changes aren't helpful Sleep medicine consult can be considered -noted FMLA may be appropriate  6. Home CHASITY: __11/2024:  -if needed contact for brief visit for [facility] -may be helpful, can work on sleep and behavior,  from [employer] Previous Recommendations: *Autism reccs *ADHD reccs *Social skills groups  It was a pleasure to work with Naila and her family today. Please do not hesitate to contact Dr. Vasques at [phone] with any other further questions or concerns.

## 2024-11-14 NOTE — REASON FOR VISIT
[Home] : at home, [unfilled] , at the time of the visit. [Medical Office: ([facility])___] : at the medical office located in  [FreeTextEntry1] :   [phone] Mom cell  I had the pleasure of meeting with Naila Souza and her parents, for a follow up appointment.  Introduction:  Initial Consultation Assessment (2020): Naila Souza presented for consultation for concerns for Autism and ADHD. This consultation was performed by phone given COVID-19.  *Autism concerns: The following is reported: -reduced eye contact, reduced response to name -speech delay -reduced shared enjoyment and joint attention -repetitively may watch part of a video -perserverates -some compulsive tendencies - everything has its place, upset if something isn't in its place -unusual visual regard -spun herself around when younger, 'laps' done around the home -holds 2 dolls with her much of the time she is at home -scripts -somewhat of a social interest but struggles with cooperative play -social pragmatic weaknesses -transition difficulties -self-directed  *ADHD: -impulsive -easily distracted -active  *Academics: -below grade level -reportedly past attempts at cognitive assessments were not successful  Overall Naila's presentation is consistent with the following:  *Autism Spectrum Disorder (requiring substantial support) (given that this consultation was not performed in person, a definitive diagnosis will be given during a follow up visit, but we discussed that by report her presentation is consistent with Autism)  *ADHD-Combined presentation. We discussed that in a self-directed individual, self-direction vs. inattention often can be difficult to decipher and that this requires continued monitoring, but a diagnosis is given at this time.  *Neurodevelopmental Disorder: this is reflective of her learning challenges and suspect cognitive challenges, in the absence of robust cognitive testing. Her learning profile and cognitive abilities require continued monitoring and evaluation overtime. Currently her cognitive abilities are unknown.  INTERIM HISTORY:  __2024: Past visit plan to increase Prozac. Increased with some improvement. Also added short acting Guanfacine for sleep which seems to have been helpful  *Endo (2023): -mood changes with menses - can consider seeing a gynecologist to manage -normal A1C, do not have concerns regarding early Type 1 diabetes, antibodies for Type 1 DM negative -should not be at increased risk of DM type 1 on top of fmhx -f/u as needed  Menstrual periods have become more regular since 2024 (first period 2022)and this has been helpful with behavior.  Transitioned to MS fairly  Current Regimen: -Prozac 30mg - increased in Oct -Guanfacine ER 3mg at night - around 6:30pm - no impact on sleep -Guanfacine 1mg prior to bed -MPH 2.5mg (started around beginning 2024, later stopped, restarted when began struggling in camp)- stopped summer 2024 -FXR 5mg: only for a few days and a lot of other changes correlating. No major benefit. No major SE - 10/2024 - haven't retrialed     *MPH 2.5mg: __2024: -take in morning -seems to keep her awake more in school -and seems calmer in general __10/2024: -perhaps tried up to 5mg, didn't try long enough to get a sense of response  *Guanfacine 1mg: __2024: -had been trialing to help with sleep, seemed to help but noted increase in tic like behavior so stopped it -tic like beh was squinting her eyes, hand over eyes and shaking fingers, repetitive exp noises - at times can interrupt it, other times got stuck on it and didn't want to be disturbed -seems to have decreased in frequency overtime __10/2024: -havent retried prior to bed __2024: -1mg prior to bed seems helpful getting her to go to sleep earlier and waking somewhat earlier  *Fluoxetine 10mg: -benefit noted -less scripting, less defiance, speech is clearer, more engaged when speech with her, quieter, calmer, more accepting of being redirected -more focused and engaged with activities __2023: -trialed 15mg but then returned to 10mg as noted tic like behavior and worried might be related __2023: -retrying 15mg - no major difference __2023: -unclear level of benefit, likely somewhat beneficial -correlated with recent increase in constant motion - in past has also had similar behavior but more recently more prominent - unclear if SE of the medication __2024: -tried weaning off down from 35, got down to 10mg but more emotional dysregulation, now on 15mg and seems to be fair. On 35mg more 'in a fog' -benefit with 15mg of Prozac __2024: -15mg _: -In Sept increased to 20mg __2024: -increase to 30mg with noted improvement - perhaps increase in facial tics but improved after a couple of wks  *Guanfacine ER 1mg: -continued outbursts and behavioral issues -Few days ago (as of 22) increased to 2mg - calmer, happier, more engaged - doing better at camp -still rigid -towards afternoon more impulsive, more impulsive eating -last night (22) diff falling asleep/woke up during the night __2022: -taking Guanfacine ER 2mg at night -less defiance, less outbursts __2023: -continuing 2mg nightly __2023: -after last visit increased to 3mg - unclear if any major difference __2024: -Guanfacine ER 3mg - tried going down to 2mg but worse sleep, so returned to 3mg __2024: -3mg _: -recently increased to 4mg - no major SE and no increased benefit __2024: -returned to 3mg as no increased benefit on 4  *Involuntary movements and vocalizations: __Neck twitch: a few beats of ear to shoulder - can happen when watching video, more common if scripting -more common since sick mid 2022 -a few times a day -doesn't seem to bother her although school thinks perhaps uncomfortable for her -no major pattern -wax and wane - school noticing more _: -shuffling gait -happening throughout the day, a few minutes at a time, variable - jerky movements, intermittent vocalizations and shuffling gait - more common if not busy, more common when sitting around or watching video -behaviors impact her from doing behaviors she wants to participate in or even eating -taking longer to eat due to the jerking/vocalizations -behaviors don't seem to bother her clearly although parents question if the grimacing is an expression of discomfort __2023: -significant decrease in these behaviors - evaluated by Neuro __2024: -involuntary movements have significantly decreased -shuffling gait has stopped -walks more fluently but covers her eyes __2024: -tic like beh was squinting her eyes, hand over eyes and shaking fingers, repetitive exp noises - at times can interrupt it, other times got stuck on it and didn't want to be disturbed -seems to have decreased in frequency overtime   Communication: __2024: -may verbally protest -often speech not very clear -Speech very clear when Ipad not working and asking for assistance  *Rigidity/rituals/atypical behaviors/RRB: -variable, inconsistent -very rigid how she does things and how she places things -watches video clips repetitively -arranges dolls in particular fashion -set rituals __2024: -cycles of stereotyped behaviors -vocalizing and gently putting finger tips to right eye -vocalizing no no no no -periodic episodes of internal scripting  *Behavior: -zoning out more -more episodes of aggression -more scripting -less engaged -noted at home and in school -more defiance -harder to focus and redirect -Teachers reportedly note (2021): easily distracted -internal/external, scripting throughout the day, many ritualistic behaviors, if changes in routine -defiant, aggressive, property destruction, visual cues seems to help redirect her, throughout the school day, needs token economy system, behaviors interfering her progress, working on group skills. When focused makes more progress. Academically behind. Messy handwriting. Needs a lot of repetition -Home: does laps around the house, was cooperative during recent physical, scripting, directable at times, at times gets more emotional - gets frustrated at times but short lived. Talks under her breath/in a whisper. Slow w/ morning routine, but tries following routine. __2022: -worse behavior with Vyvanse __3/ School: -still diff focusing -fewer outbursts but still present -body jerking, vocal grunting - hard to get through -jerky movements - parent hasn't generally seen -giving more rewards more frequently -more snacks for sensory, more movement breaks __2022: -some improvement over recent past __2022: -certain sequencing she needs to complete - walk, stop, walk back, go forward again -internal and external scripting - in her own world, may not always respond -during scripting may get louder, more emotional, hard to calm down -Camp: had been in an inclusion group prior years with aide for SPED campers -but this year more problematic, needs more supports-- --now in a self-contained group __2022: -some improvement w/ Intuniv 2mg __2022: -some improvement w/ Intuniv and Prozac although some continued physical resistance in school and at times at home __2023: -continued scripting -may physically indicate 'no' 'go away' -if frustrated scripting becomes louder -overall behavior has been fair/manageable __2023: -increased impulsivity - got flour everywhere in kitchen tried doing her old laundry - at times impulsivity related to her wanting to be independent -scripting: becoming more internal -calm moments, more energetic moments __2023: -continued significant challenges with 'constant motion' (waxes and wanes, was present prior to SSRI trials as well, but has been worsening), internal and external scripting interrupting her fxing and related inattention, behavioral outbursts, aggressive behaviors (more common when around her period) -Constant motion - pacing, circles room -Gets stuck scripting/yelling/stimming - with time usually calms after 20-30 minutes - AT home more manageable. At camp/school more challenging to give her time/space. -program in her head she needs to go through before can move on - like in a trance Camp: -some screaming episodes 20-30 minutes, at times aggressive - may push/swat others __2024: -overall behavior has been better than the past, although persist being very challenging *School: -no recent aggressive episodes -some ritualistic behaviors -sleep is largest issues __2024: -participating more in class -no recent aggressive behaviors -less sleeping in class - sometimes seems to be task avoidant at times __10/2024: -Becoming somewhat more aggressive, may swat peers or staff -Respite program: needs time to deescalate, may need to remove from peers when gets upset -Ipad: -stuck on having it with her all the time -in school has to work for the Ipad - perseverates (during visit if took time loading increasing became upset, yelling -'its NOT working!!!!' and repeatedly tapping it, and hard to console) __2024: -Home: diff getting Ipad away from her. Transitioning better away  from Ipad. If Ipad not working well still looking for parent phone. Still some frustration, but better, settles after a few minutes. -School: Improvement in beh in school . No more swatting staff.  Still motivation issues. Still occasional outbursts with frustration -no more aggressive behaviors in general -Slow going in general, poor motivation, slow going in general  __2024: -[facility] Respite program - saying now may no longer be able to stay there, not enough support there. Parents looking now in Home [facility] to see if able to get and perhaps can go w/ her to [facility] program Tried    *Sleep: -since Dec worsening/less consistent sleep -diff settling down for sleep - wouldn't fall asleep until 1 am -some nights asleep ~9pm -Wakes: if goes to sleep later diff to wake in the morning wants to sleep until 11am, in general if goes to sleep regular time wakes around 6 -2 instances of bedwetting recently - although may have been attention seeking - was awake trying to fall asleep -Melatonin 5mg - unclear if it helps -Most nights 1-2 nights up a little later than in general __2023: -more diff going to sleep, wants to stay up and play __2023: -prior to sleep issues ?related to period perhaps - some nights asleep around 9 and some nights around 1am - worsened during period __2023: Sleep improved as more active over summer - off and on as well __2024: -bedtime routine around 9:30/10pm in bed - routine takes time as she wants to do things in her own time/way - process takes a long time. Transitions between things are very time consuming -Majority of wk not asleep until Midnight to 1-2:30AM. Most mornings have to wake her around 7am, other times she wakes on her own earlier and then takes a nap for 3 hours in school. On wknds may sleep until 10-11am. - Due to slow going in morning often parent needs to bring her to school and she gets to school late -Stopped melatonin - no major differences __2024: -likes sleeping late - sometimes impacts her getting to school on time -sleep times vary __10/2024: -hard to get up in the morning -falling asleep around MN most night, sometimes 3am, waking 9-10 am and getting to school late, hard to get up in the bed __2024: -addition of short acting guanfacine helps her fall asleep -Morning: still slow going in the morning, so getting  to school late - better b/c going to sleep earlier. Not napping in school -seems tired end of school day   *Social: -enjoys being around her peers -takes more to engage her than in the past  *Anxiousness: -when on Zoloft somewhat calmer -a little more engaged -seems more overwhelmed at times, talking in a low tone, reduced EC -seems to be more of an issue recently __2022: -some improvement with Prozac  *Staring off: (2022) In School: There have also been instances where she stares off to the side. Her eyes move to the side and remain fixed and she does not blink. Additionally, she does not respond to or track visual stimuli brought near her such as a waving hand. Home: -havent noticed recently __2022: none recent  Private services: -began working with  retired [employer] program - has been helpful -music therapy - stopped -Private SLT - still attends -Respite services  *School input (24): We've noticed an increase in "no please!" which she uses to cover a lot of "annoying to her topics" - it's her way of saying "be quiet!", "too close", "I don't like that!" etc. She has been more willing to be redirected to a more specific statement that targets the external activity she doesn't like.  We've seen a great decrease in aggression - 1 swat on  and also she threw something on the floor. We have not seen anything else in terms of aggression in months!!!!!!!! Such great progress!  We notice a bit more of ritualistic behaviors the week before she gets her period. More singing, more "dancing in her seat" kind of thing, but nowhere near the amount she was doing in the beginning of the year. She transitions beautifully in the hallway and to classes. Biggest issue we have is the sleeping, but other than that she is an absolute delight! ***  SCHOOL HISTORY/DEVELOPMENTAL SERVICES:  Academics: -below grade level, closer to K level, starting to read, 12-15 sight words, can do basic addition -hard to engage to sit and work with her  *Report Card ([id number], Grade 1): -mostly 1s and 2s  School: [employer] Grade: 8th Services: -8:1:3 -SLT -OT -Social group  Past school: School Name: [employer] Grade: 5th Services: *IEP -Classification: ASD -8:1:3 -SLT: individual and small group -OT -Social group -perhaps some mainstreaming -Chorus - likely w/ an aide -Behavior intervention services - [employer]BA involved in her programming - monitoring and tracking her behavior in class-([facility] trained therapist/teacher in place/[facility]) -Parent training/counseling -ESY (although attended a summer camp program which she did well in ) -NO testing accommodations Comments: -demonstrates global developmental delays impacting academic progress -thrives on classroom routine -does well with rote questions -requires focusing prompts to answer questions in group -working with her on what quiet means and will incorporate times that acceptable to script and times were it isn't -responding to social greetings and farewells are emerging with visual support and models -establishing eye contact is an ongoing goal -needs consistent modeling and prompting to interact with her peers during social times  __ 23: School report: Naila arrived at school today calm. Around 8:50 we began transitioning down the hallway to go to the bus for our middle school visit. She began to engage in negative vocalizations that quickly turned into screams. She then laid on the bench in the front carl and continued to scream and aggress toward anyone that came in reach of her. We were able to get her calm enough to comply with standing and walking. We began slowly walking down the carl with many stops along the way (I will describe this behavior below). She continued to scream during the walk but was not aggressing. Once we got to the turn in the hallway, the principal joined us. She reminded Jd that she is okay and we are all here for her. Naila began to escalate in her screams and begin to aggress. Most of the aggression at this point was towards the principal but would switch to any person who tried to block or redirect her. The aggression included swats, hits, pushes and kicks. She was so escalated we needed to help her walk into the OT room to deescalate. Once there, she laid on the floor and we got her a pillow and a blanket. She was crying so hard she was no longer making sounds. We shut off the lights and positioned ourselves close enough to her, so she knew we were there to support her but far enough away to give her the space she needed to come back down. Once semi quiet, she earned the iPad and watched it for about 6 minutes. The timer then went off and she earned tokens for completing instructions. She was able to sit up, stand up, sit in a chair and walk to class where she earned the iPad again. From this time until approximately noon (about 2 hours), Naila was on edge with some screams and aggression but was able to complete some work. Unfortunately, around noon she began to escalate again. This episode took place in the classroom and she sat on top of her desk which was unsafe. We directed her into the work room in the back of the classroom where it was quiet and also where she would not disturb her classmates. At this point the magnitude of the aggression was very high. Kailyn and SHAHZAD were in the room with her with her token board and reinforcement. We tried a variety of methods, reinforcing moments of quiet, telling her she is safe and we are here to support her, ignoring behaviors, blocking and redirecting, as well as, showing her the reinforcement and providing almost immediate access for any type of compliance. None of these methods were successful. She aggressed continuously for almost 50 minutes. For reference, I was able to take 1 minute of frequency data and there were 46 aggressions in one minute. Aggression included hits to our bodies and faces, kicks to our bodies and wrapping her arms around our necks and slamming her body into us. At one point, we stepped out of the room to give her space and she continued to hit the walls and screaming while looking at us. She also went through a period of hugging me in a consoling way then stepping back and hitting me in the face then returning to hugging. This sequence happened 6 or 7 times. Eventually, we were able to guide her out of the room and back to her table where she was able to earn reinforcement and transition to lunch. The transition took about 26 minutes, and she did not make it to the lunchroom. Instead, in order to ensure she got to eat, we stopped in a one of the teacher's quiet small office spaces which is 165 feet from [address]. Once inside she was able to sit calmly and eat her lunch. After lunch, iPad was terminated, and she began to work and earn on her regular schedule. It was then time to transition to chorus, she made it to approximately the OT room (similar distance as the previous transition) in about 23 minutes. It was at that point that we realized we needed to turn back or she would not make it back to class in time for dismissal. We return walking with her continuously stopping and scripting. Once we were in view of that same quiet small office space the TA pointed and said "Jd, look we are going back to where you ate lunch" and she very quickly walked straight to the room and sat down. She was then able to sit and work then complete her homework schedule. She walked to the classroom packed up and went outside with a positive affect. Kailyn spoke to mom and came back into the school. I saw people at the door and heard Jd yelling. I ran down and went outside. Kailyn notated that she was walking to the front of the building and saw her starting to engage in behavior so she went back. Upon my arrival there were 4 staff there and mom monitoring the street due to  time and her safety. Kailyn was blocking her head as she was laying on the cement. Kalina, classroom SLP, ran inside to get a bean bag to put under her head, again for safety purposes. We directed the other staff to stand out of sight but to continue to monitor the road and people walking by. She eventually stood up and with many prompts walked to the car which was parked in the back of the building. The whole process took about 45 minutes. The scripting behavior I am referring to throughout the summary is a newer behavior. It is a specific script where she stops, puts her hands to her mouth likes she is yelling and produces an unintelligible script, lifts her hands above her head as if she is yelling then on her hips like she is angry. She then spins around and looks at the floor. Sometimes she will complete this one time and continue walking/ working. Other times she has to complete this 6 or 7 times in a row before getting started.  *School note (2023): -recently demonstrating significant increase in vocalizations/involuntary movements leading to a loss of fx in school -for past 2 months these have been debilitating for Naila -do not appear behavioral or sensory in nature - sensory/behavioral plans have not been successful -behaviors occurring in all settings/all teachers, preferred and non-preferred activities, resting times = do not appear behavioral or self-stim in nature -she tries to work through these involuntary mvments to complete her work or participate in activities she enjoys and she cannot - gets frustrated if times runs out b/c wanted to finish preferred task but body isn't letting her -quick head tilts, shoulder shrugs, freezing during a mvmt, shuffling gait, screaming, vocalizations, kicking, muscle twitches - data shows occurring every 3-20 seconds -diff with FM tasks, diff eating/opening packages, cant participate in gym activities she could in the past, diff participate in new learning activities -issues since ~January  *22 Behavioral Consultant provided info Overall, there has been a decrease in the upset/"emotional" episodes that typically included aggression, property destruction and screaming. The week before she was sick there was an increase in screaming and aggression. There is the possibility that it may have been due to not feeling well ahead of testing positive. She continues to engage in high rates of self-stimulatory behaviors and non-contextual vocalizations that interrupt her ability to engage in academic tasks. These behaviors occur throughout the day and occur during leisure and preferred activities as well. They are not restricted to times of academic tasks or non-preferred demands. We continue to see non-compliance during times of academic tasks and demands. These can present as task avoidance, but also includes times when we have difficulty refocusing and redirecting her back to tasks. There are times that she appears as "stuck" within her self-stimulatory and scripting behaviors and does not appear aware of her surroundings or directions being given. When she presents as more "stuck" her motor movements appear to be brief and jerky (i.e. a quick extension and retraction of her arm out in front of her) and her vocalizations are generally more sound or grunt based versus intelligible words or tv scripts. There have also been instances where she stares off to the side. Her eyes move to the side and remain fixed and she does not blink. Additionally, she does not respond to or track visual stimuli brought near her such as a waving hand. please let up know if there's any additional information or anything we can provide more examples of or elaborate on  In past: Goes into 12:1 for a morning meeting, and mainstreamed for some activities Does well with structured routine. Fairly independent in some settings. Mainstreamed largely for socialization opportunities. Some mainstreaming with an aide to help with games, sharing and conversation - -may need prompts to respond or initiate a greeting.  : School Name: [employer] through [employer], 2 years Grade:  Services: -1st year in  - 10 students -2nd year in  - 8:1  *Behavioral Consult Progress report [facility] ([id number], 2019): -improving ability to tolerate change in routine, redirect, and interrupt preferred play -working on working quietly   *[facility]/Language Hearing clinic (treatment plan 6:6-6:8yo yo, 2019): -working on functional communication, play skills, Pragmatic skills, answering 'wh' questions  *Private Services: -speech therapy at [facility] intermittently -Music therapy -Havent pursued private CHASITY services  *Teacher intake form (2021, 3rd grade): -Self-contained, OT, SLT -requires token economy to keep motivated and on task -seatwork: needs teacher prompt, w/o support work is illegible or incomplete -Academics: failing -Reading: = K level, Math = K level -Social: doesn't seek peers/adult relationships, will sit and parallel play but requires a lot of teacher support to interact appropriately  *Teacher intake form (3/2020): -self-contained, 7 students, multiple other staff, K-2 class, in 2nd grade -SLT -OT -Beh intervention services -highly benefits from CHASITY / discrete trials -thrives with routines, follows most directions -difficulty with changes in routine, generalizing skills, constant refocusing prompts -token board for motivation and on task behavior -Social: peers from Gen ed class seek her out, her interactions with these children are adult led, most of the time she requires prompting to respond back/engage appropriately -recently developed a sense of humor -Academics: below average - 2 or more yrs below grade level -Reading: not retaining learned words or generalizing words across difft materials -Math: K level -Writing: cant spell without visuals, needs some guides to stay on the line -Seatwork: inconsistent - at times needs significant prompting - other days - still requires prompting but not as much   PREVIOUS ASSESSMENTS/SERVICES:  Early Intervention: no services  Private speech therapy around 2 years old.  Earlier evaluations: reportedly wasn't classified as having autism  CURRENT FUNCTIONING/HISTORY OF PRESENTING CONCERNS:  *********AS noted during her initial consultation****   Concerns noted on our intake paperwork include: -poor speech/communication and social skills -makes limited eye contact -bright, comprehends more than she says verbally -difficulty interacting with others- jonathan. verbally - likes to be around other children but mostly parallel plays or needs prompting to interact verbally -she understands much more receptively -she can give appropriate responses with visual and verbal cues -lacks patience and can become frustrated when things don't go her way -responds best to routines/structure  Today's concerns: -autism concerns -ADHD concerns  Concerns: -limited eye contact -difficulty socializing -repetitive movements -perseverations -some compulsive behaviors -everything has a place - need to move something back if not in its own place-table needs to be placed in an exact manner -puts toys exactly / same position and location as she found it   Language/Communication: -Shared enjoyment/joint attention: likely decreased -turns to others for help -Response to name: better now than in the past, reduced in the past -Eye contact: good with requests from her, other times reduced or needs prompts -able to follow multiple step instructions -able to speak in sentences - receptive seems stronger than her expressive language, sometimes speaks in clear sentences, other times less well developed -often scripting  Behavioral: -ADHD: a number of concerns, impulsive, highly distractible, may need multiple prompts -frustrated easily at times -improving with sharing and transitions -still issues with calming when upset, fewer meltdowns than in the past  Emotional: -anxiousness/resistance at doctor's office.  Social/Play Skills: -scripts frequently when playing -play somewhat appropriate - demonstrates pretend play with play dolls- has 2 dolls in her arms or near her often when she is home (not in school) -some appropriate play with toys at home -demonstrates some pretend play with kitchen set -difficulty taking turns - timer seems to help -some social interest in peers - on her terms -very connected to her older sister - responds well to Soheila her older sister - somewhat of a jokester and can  on jokes quicker than her older sister  Atypical Behaviors/Sensory: -hand flapping: rarely -in past: spun herself around in circles -walks around in circles/laps around the house -may watch same scene of a video repetitively - may want parent to repeat something she says in regards to the video - may repeat saying it until validated -Focus on wheels: no -no toe walking -stands close to TV -difficulties with changes in routine and transition issues -some visual peering/unusual visual regard - usually with TV -generally no visual inspection -play can often be in a set pattern - can get upset if someone wants to play in a different manner -may chew banana and then spit it out into garbage due to texture issues -tactile: sensory seeking - when watching TV may touch the screen, no major issues with clothing, likes deep hugs when upset, often makes use of sensory room in school -may repetitively watch the same show in the same way -often does 'laps' around the house - jonathan. when anticipating something -Loud noise: generally no issue  Motor Abilities: -fair  ***********   *MEDICAL HISTORY:  Current Medications: -Prozac 30mg - increased in Oct -Guanfacine ER 3mg at night - around 6:30pm - no impact on sleep -Guanfacine 1mg prior to bed -On HOLD - MPH 2.5mg (started around beginning 2024, later stopped, restarted when began struggling in camp)- stopped summer 2024 -ON HOLD - FXR 5mg: only for a few days and a lot of other changes correlating. No major benefit. No major SE - 10/2024 - haven't retrialed -Claritin prn  Medication History: - swallows  *GIOVANNY 10 mg (2020-~2021): -10mg - unclear duration / benefit/SE -~2021 - trialed 20mg - unclear effects/SE - correlated with change in her usual routine. Seemed that worsened sleep, perhaps made her less interested in food, perhaps more scripting/rituals and more common meltdowns jonathan in afternoon around time medication thought to perhaps be wearing off --- although behavioral issues also noted on days that wasn't taking the medication so unclear if she truly failed GIOVANNY/stimulants - may trial again in future. Perhaps was quieter at times in school w/ medication  *Guanfacine 1mg (21 -2022, ~2022 restarted and later STOPPED 2022, 2024- ): -2021: 1mg BID - clear benefit noted, calmer, more cooperative, more engaged -2021: to trial small increases -2022: stopped, and shortly after increased Lexapro to 7.5mg - more active, impulsive, eating more, some more diff falling asleep -2022: restarted and later stopped -~2022-2023 - trial 1mg before bed as sleep aide, didn't help - stopped -2024: to retry as sleep aide -2024: helped with sleep 1mg at night -but parent concerned seem to correlate w/ new stim/tic like behavior and stopped, might retry -2024: benefit with sleep initiation w/ 1mg  *Zoloft 25 mg tab (2021 -10/2021 stopped): -seemed somewhat zoned out, at times somewhat sleepy in class/drift off, napped when usually didn't nap -once stopped Zoloft - more scripting, more circling around, more repeating, repetitive, scripts the same story, moving hands in set way, gets emotional at times and yells out - tab  *Concerta 18mg (~-10/2021 -for a few days and stopped, trialed 3 days - can consider trialing again at some point): -more meltdowns, on and off throughout the day, emotionally reactive/less easily redirectible -aggressive, yelling, agitated, hit, more physical -Duration: lasted most of the day, less than 12 hours  *Vyvanse 10mg (2021 -2022): -more outbursts, throughout day but mostly as wearing off -lasted a few hours, somewhat calmer -more scripting, more distracted by internal stimuli -Sleep: no impact -Diet: slight impact - less snacking  *Lexapro (2022-2022): -to trial to address outbursts, scripting, and behavioral challenges -2022: trialing 7.5mg - some decrease in internal scripting noted, seems happy -3/2022: increased to 10mg -2022: trial increase to 15mg -2022:-weaned off, as weaned off more impulsive behavior, more scripting, increased appetite, more rigid. Parents stopped as had continued with emotional outbursts - meltdowns in school, internal and external scripting. Not helpful enough and continued outbursts.   *AXR 5mg(3/2022-~2022): - unclear impact, some diff with sleep but manageable, perhaps fewer outbursts -3/31/22: trialed increase to 10mg - 'seemed off' and returned to 5mg -2022: trialed 10mg - more outbursts and scripting - stopped.   *Guanfacine ER (2022-): -2022: taking 2mg with noted benefit __2023: 3mg nightly __2024: tried going down to 2mg but worse sleep, so returned to 3mg __10/2024: recently increase to 4mg - no increase in benefit or SE __2024: returned to 3mg as 4mg wasn't any better  *Prozac 10mg (~2022-): -benefit noted -more engaged, less scripting, calmer -2023: trialed to 15mg - but noted tics and returned to 10mg. Benefit noted (discussed tics likely unrelated) -2023: parent increased to 15mg again -2023: 30mg daily -2024: reduced to 15mg - at 15mg beneficial, at 10mg insufficient benefit -2024: 15mg __10/2024: recent increase to 20mg __2024:  increased to 30mg w/ some behavioral improvement   *MPH 2.5mg (~2024-): -seems to be helping her stay awake in school  *FXR 5 (2024-stopped after few days): -tried for about one wk -no major difference -perhaps scripting more   Allergies: -none  UPDATED PAST MEDICAL HISTORY: There have been no recent major medical changes.  Birth History: -born FT, 6.13lbs -regular delivery  ROS: A 10-point review of systems was performed. No concerns regarding vision and hearing. No cardiovascular, respiratory, gastrointestinal, renal, endocrine, neurologic, musculoskeletal, or dermatologic concerns.  *[facility] evaluation Dr. Wilver William - Chief developmental pediatrics: worked on ways to address her behavior: reportedly impression was that Naila didn't have autism.  Summer 2020: had episode of passing out - hospitalized and evaluated by Cardiology and Neurology. Neurology considering/ordered brain MRI. No seizure activity found  *20: -Reviewed inpatient Cardiac Consultation note (hospitalized following episode of syncope) *Cardiology (2020, Vee Huff and Elida Golden): -no cardiac contraindications for starting ADHD medication  Neurology (): -EEG performed -Tic like behaviors have improved/lessoned -MRI normal  Audiology (2-3 yo, Dr. Osborn): hearing thought to be normal, MT placed  Vision: none  Hospitalizations/ Surgeries: -MT placed   FAMILY HISTORY: Her father has a MA and is an . He received Speech services when younger. Generally healthy. Her mother has a MA and is a . She received Speech services when younger. Has type II diabetes - perhaps Type 1. Thyroid issues. She has a sister (Soheila, ). Soheila is also followed in our center  There is an extended family history of: -ADHD, learning problems, speech delay, borderline range of intellectual functioning: sister -Bipolar: Paternal uncles, PGM -Schizoaffective: Paternal uncle, lives in a special group home/residential program, also "simple-minded" - he reportedly trialed Prozac and it made him psychotic - requiring hospitalization - improved once stopped -Some easy distraction: father -type II diabetes: maternal side of family -heart murmur: mother, father, and other family members -Fatal Cardiac arrest: uncle, was living in adult independent residence, 48 yo  There is no history of heart rhythm problems or of sudden death.  SOCIAL HISTORY: -lives with parents and sister  PE (23 in person): -well appearing -S1S2 RRR -CTA b/l -FROM of extremities -no consistent muscle rigidity, no clear cogwheel rigidity -no clonus appreciated -fair tone and strength -EOM appeared intact __10/2024: -S1S2 RRR  Observations:  (Tele 21): -calm during visit, frequent verbalizations to herself, holding her hand up with perhaps visual inspection, stopped and greeted me with prompt, pacing around the home, cooperative (Tele 3/31/22): -calmer and more cooperative appearing - mother called her over and she complied, and when prompting said hello with associated EC (in person 2023): -frequent almost barking like vocalizations, and other noises while in waiting room - less frequent but still occurring intermittently during 70 min time in room -neck twitching, shoulder to ear, leg movements, face grimacing - also myoclonic in appearance - also occurred more freq in waiting and less common when involved w/ activity in office -intermittent shuffling gait/pause when walking and then continue walking more fluidly -when walking from waiting room, in office able to walk back and forth across room when requested fluidly -mostly calm during visit and involved in playing with a bin of toys - involuntary movements and vocalizations significantly decreased for a good portion of the visit - for around ~40 minutes few if any were noted, but as visit progressed were some episodes of these movements and vocalizations - usually lasting less than 30 seconds -crayons fell on floor - she wanted to pick them up and put back on table -one toy that was missed when had put away toy bin - despite redirection she went around doctor to put back in bin - seemed driven to do so -answered some questions - yes/no when given options of what to play with -cooperative with vital signs and exam ( 2024): -social rapport not established -intermittently gently touching eye with fingers and vocalizing, intermittent repetitively saying no -self-directed   * LABORATORY/TEST RESULTS/DEVELOPMENTAL ASSESSMENTS:  ***Testing as recorded in her IEP: (2018): __Goldman Fristoe Test of Articulation 3: -Sounds in Words: 69 __Expressive One Word Picture Vocabulary Test-4 (EOWPVT-4) (SS): 14 % __Receptive One Word Picture Vocabulary Test-4 (ROWPVT-4) (SS): 90 __Preschool Language Scale-5 (SS)(SD of 15): -Auditory Comp: 67 -Expressive Comm: 64 -Total Language: 63 __ABAS (Adaptive Behavior Assessment System-III) - Teacher: -General Adaptive Composite: 1 % -Gayle Chuy Tests of Achievement IV (SS) (SD = 15): -Many scores noted as "Not scorable" -Letter Word Identification: 84 -Passage Comprehension: 45 -Reading : 65 -Broad Readin -Basic Reading Skills: 82  -Applied Problems: 51  -Spellin -Broad Written Language: 58 -Basic Writing skills: 84 -Written Expression: 65  Cognitive assessment: reportedly attempted in the past but unable to complete testing secondary to behavior.  *Psychology Eval (2.10 yr): -difficult to engage -very short attention span -looked at herself in mirror for 3 minutes -difficulty cooperating with requests -inconsistent eye contact -often ignored those around her and needed support to engage and attend -FSIQ could not be obtained, non-compliant   (3/2020) __Child and Adolescent Symptom Inventory-5(DAVID-5):  Informant: Parent -inattention:  -hyperactivity/impulsivity: 3/9 -oppositional and defiant behaviors: 0/8 -feels compelled to perform unusual habits: often -makes vocal sounds for no apparent reason: often -social deficits/restricted and repetitive interests/behaviors: a number endorsed No other significant symptoms of any emotional or behavioral disorder  Informant: Teacher -inattention:  -hyperactivity/impulsivity:  -oppositional and defiant behaviors:  -Very often: -feels compelled to perform unusual habits, rocks, makes vocal sounds for no apparent reason -social deficits/restricted and repetitive interests/behaviors: many endorsed No other significant symptoms of any emotional or behavioral disorder Comments: -overall pleasure to have in class, happy girl -has been presenting with more anxiety compared to last year -repetitive movements - rocking, vocalizations, scripting - and rigidity - often impede her ability to learning and retain info, as well as generalize skills -thrives on routine/structure - if routine changed - can have tantrum and be difficult to redirect - screaming, crying, some staff aggression  Brea Assessment  -Informant: Caregiver on Intake Form -Inattention:  -Hyperactivity/Impulsivity:    (2021) Guanfacine 1mg BID [place] Assessment: -Informant: Teacher -Inattention:  -Hyperactivity/Impulsivity: 3/9 -Academics: problematic.

## 2024-12-12 NOTE — END OF VISIT
[Time Spent: ___ minutes] : I have spent [unfilled] minutes of time on the encounter which excludes teaching and separately reported services. declines

## 2024-12-12 NOTE — PLAN
[FreeTextEntry3] : 1. Follow up is scheduled in 1-2 months  2. Medication:  *CURRENT: -Intuniv: Current 3 mg -continue (no increased benefit on past trial of 4mg) -Fluoxetine 30mg: continue. If needed consider increase to 40mg. If fails to wean and consider trialing alternate such as Zoloft -Short acting Guanfacine: Continue 1mg prior for sleep initiation. Can shift closer to bedtime and if needed increase to up to 2mg -MPH 2.5mg in AM: recc consider retrialing given Motivation/slow going issue. Give first thing in morning. Can consider using multiple times daily if good response or retrialing XR -FXR 5mg: Previously noted can retry in future once back to usual routine - can trial increase in 5mg increments depending on response/SE. Monitor scripting. If unavailable trial [place] ER if covered. Target: impulsive behaviors/ADHD sxs/behavioral issues - Parents haven't retried -ABILIFY - 10/2024 -discussed Abilify indicated and is an option. Parents hesitant and not interested in a trial at this time. Of note fmhx of Diabetes (Naila has been evaluated by Endo) and hx of atypical movements while not on atypical although these movements have decreased significantly recently  Additional options -Discussed addtl options such as using MPH short acting once or BID, trialing increase in Intuniv, trialing Abilify. As has generally been doing better wont trial Abilify at this time.  1/2024: -evaluated by Endo (Note - PMD provided small dose of Xanax to help with blood draw, parents tried [address] and at [facility] phlebotomy w/ poor experiences)  1/2024: -discussed trial of Abilify can be considered overtime and may target a number of Naila's challenging behaviors -While fmhx of DM does per her at increased risk with atypicals, recent Endo labwork is reassuring to a degree -if atypicals started may be challenging monitoring for atypical movements as a SE  Previously has discussed that Child Psychiatry consultation and ongoing care (tasked SW to assist - direct referral to [facility] if needed) may be beneficial. 1/2024: is doing better currently than in the past __10/2024: -[facility] info provided. Direct referral to [facility] being made __11/2024: -direct referral offered. Parent declined at this time  3. OPWDD: submitting application  4. Home CHASITY: discussed looking into  We discussed effects/SE of: - Stimulants (for her behavior and ADHD) -Alpha agonists (for behavior and ADHD) -SSRI's (for her anxiousness, rigidness, behavior, easy frustration, rituals, scripting). [email or web address].[email or web address]_[email or web address] - ADHD  5. Sleep: __1/2024: -significant challenges - to trial short acting guanfacine as sleep aide -recc scheduled in Sleep clinic eval in case needed __11/2024: -discussed shifting bedtime as tolerated slowly earlier hopefully leading to waking earlier and getting to school earlier -if upcoming med changes aren't helpful Sleep medicine consult can be considered -noted FMLA may be appropriate __12/2024: -consider trialing up to 5mg melotonin -adjustments to short acting Guanfacine as above -diff at night separting from IPad to go to sleep, tends to watch videos on IPad and do other activities. Consider TV in background to replace Ipad and perhaps easier to transition away from Ipad and help her fall asleep sooner -rec sleep medicine consult - info provided  6. Home CHASITY: __11/2024: -if needed contact for brief visit for CARS -may be helpful, can work on sleep and behavior,  from Ipad Previous Recommendations: *Autism reccs *ADHD reccs *Social skills groups __12/2024: -dx letter provided per request as part of process looking into Home CHASITY. If needed to schedule time for CARS  It was a pleasure to work with Naila and her family today. Please do not hesitate to contact Dr. Vasques at [phone] with any other further questions or concerns.

## 2024-12-12 NOTE — REASON FOR VISIT
[Home] : at home, [unfilled] , at the time of the visit. [Medical Office: ([facility])___] : at the medical office located in  [FreeTextEntry1] :   [phone] Mom cell  I had the pleasure of meeting with Naila Souza and her father, for a follow up appointment.  Introduction:  Initial Consultation Assessment (2020): Naila Souza presented for consultation for concerns for Autism and ADHD. This consultation was performed by phone given COVID-19.  *Autism concerns: The following is reported: -reduced eye contact, reduced response to name -speech delay -reduced shared enjoyment and joint attention -repetitively may watch part of a video -perserverates -some compulsive tendencies - everything has its place, upset if something isn't in its place -unusual visual regard -spun herself around when younger, 'laps' done around the home -holds 2 dolls with her much of the time she is at home -scripts -somewhat of a social interest but struggles with cooperative play -social pragmatic weaknesses -transition difficulties -self-directed  *ADHD: -impulsive -easily distracted -active  *Academics: -below grade level -reportedly past attempts at cognitive assessments were not successful  Overall Naila's presentation is consistent with the following:  *Autism Spectrum Disorder (requiring substantial support) (given that this consultation was not performed in person, a definitive diagnosis will be given during a follow up visit, but we discussed that by report her presentation is consistent with Autism)  *ADHD-Combined presentation. We discussed that in a self-directed individual, self-direction vs. inattention often can be difficult to decipher and that this requires continued monitoring, but a diagnosis is given at this time.  *Neurodevelopmental Disorder: this is reflective of her learning challenges and suspect cognitive challenges, in the absence of robust cognitive testing. Her learning profile and cognitive abilities require continued monitoring and evaluation overtime. Currently her cognitive abilities are unknown.  INTERIM HISTORY:   2024: -Wed day of visit. Prior Thur sprained ankle, has aircast - change in sleep since started, missed some doses of the medication as waking up later. -able to move around -sleep has been disrupted since injury, sleeping later into day. -poor sleep still was issue prior to injury  __2024: Past visit plan to increase Prozac. Increased with some improvement. Also added short acting Guanfacine for sleep which seems to have been helpful  *Endo (2023): -mood changes with menses - can consider seeing a gynecologist to manage -normal A1C, do not have concerns regarding early Type 1 diabetes, antibodies for Type 1 DM negative -should not be at increased risk of DM type 1 on top of fmhx -f/u as needed  Menstrual periods have become more regular since 2024 (first period 2022)and this has been helpful with behavior.  Transitioned to MS fairly  Current Regimen: -Prozac 30mg  -Guanfacine ER 3mg at night - around 6:30pm - no impact on sleep -Guanfacine 1mg prior to bed -MPH 2.5mg (started around beginning 2024, later stopped, restarted when began struggling in camp)- stopped summer 2024 -FXR 5mg: only for a few days and a lot of other changes correlating. No major benefit. No major SE - 10/2024 - haven't retrialed  *MPH 2.5mg: __2024: -take in morning -seems to keep her awake more in school -and seems calmer in general __10/2024: -perhaps tried up to 5mg, didn't try long enough to get a sense of response  *Guanfacine 1mg: __2024: -had been trialing to help with sleep, seemed to help but noted increase in tic like behavior so stopped it -tic like beh was squinting her eyes, hand over eyes and shaking fingers, repetitive exp noises - at times can interrupt it, other times got stuck on it and didn't want to be disturbed -seems to have decreased in frequency overtime __10/2024: -havent retried prior to bed __2024: -1mg prior to bed seems helpful getting her to go to sleep earlier and waking somewhat earlier  *Fluoxetine 10mg: -benefit noted -less scripting, less defiance, speech is clearer, more engaged when speech with her, quieter, calmer, more accepting of being redirected -more focused and engaged with activities __2023: -trialed 15mg but then returned to 10mg as noted tic like behavior and worried might be related __2023: -retrying 15mg - no major difference __2023: -unclear level of benefit, likely somewhat beneficial -correlated with recent increase in constant motion - in past has also had similar behavior but more recently more prominent - unclear if SE of the medication __2024: -tried weaning off down from 35, got down to 10mg but more emotional dysregulation, now on 15mg and seems to be fair. On 35mg more 'in a fog' -benefit with 15mg of Prozac __2024: -15mg __10/2024: -In Sept increased to 20mg __2024: -increase to 30mg with noted improvement - perhaps increase in facial tics but improved after a couple of wks  *Guanfacine ER 1mg: -continued outbursts and behavioral issues -Few days ago (as of 22) increased to 2mg - calmer, happier, more engaged - doing better at camp -still rigid -towards afternoon more impulsive, more impulsive eating -last night (22) diff falling asleep/woke up during the night __2022: -taking Guanfacine ER 2mg at night -less defiance, less outbursts __2023: -continuing 2mg nightly __2023: -after last visit increased to 3mg - unclear if any major difference __2024: -Guanfacine ER 3mg - tried going down to 2mg but worse sleep, so returned to 3mg __2024: -3mg _: -recently increased to 4mg - no major SE and no increased benefit __2024: -returned to 3mg as no increased benefit on 4  *Involuntary movements and vocalizations: __Neck twitch: a few beats of ear to shoulder - can happen when watching video, more common if scripting -more common since sick 2022 -a few times a day -doesn't seem to bother her although school thinks perhaps uncomfortable for her -no major pattern -wax and wane - school noticing more _: -shuffling gait -happening throughout the day, a few minutes at a time, variable - jerky movements, intermittent vocalizations and shuffling gait - more common if not busy, more common when sitting around or watching video -behaviors impact her from doing behaviors she wants to participate in or even eating -taking longer to eat due to the jerking/vocalizations -behaviors don't seem to bother her clearly although parents question if the grimacing is an expression of discomfort __2023: -significant decrease in these behaviors - evaluated by Neuro __2024: -involuntary movements have significantly decreased -shuffling gait has stopped -walks more fluently but covers her eyes __2024: -tic like beh was squinting her eyes, hand over eyes and shaking fingers, repetitive exp noises - at times can interrupt it, other times got stuck on it and didn't want to be disturbed -seems to have decreased in frequency overtime   Communication: __2024: -may verbally protest -often speech not very clear -Speech very clear when Ipad not working and asking for assistance  *Rigidity/rituals/atypical behaviors/RRB: -variable, inconsistent -very rigid how she does things and how she places things -watches video clips repetitively -arranges dolls in particular fashion -set rituals __2024: -cycles of stereotyped behaviors -vocalizing and gently putting finger tips to right eye -vocalizing no no no no -periodic episodes of internal scripting  *Behavior: -zoning out more -more episodes of aggression -more scripting -less engaged -noted at home and in school -more defiance -harder to focus and redirect -Teachers reportedly note (2021): easily distracted -internal/external, scripting throughout the day, many ritualistic behaviors, if changes in routine -defiant, aggressive, property destruction, visual cues seems to help redirect her, throughout the school day, needs token economy system, behaviors interfering her progress, working on group skills. When focused makes more progress. Academically behind. Messy handwriting. Needs a lot of repetition -Home: does laps around the house, was cooperative during recent physical, scripting, directable at times, at times gets more emotional - gets frustrated at times but short lived. Talks under her breath/in a whisper. Slow w/ morning routine, but tries following routine. __2022: -worse behavior with Vyvanse __3/ School: -still diff focusing -fewer outbursts but still present -body jerking, vocal grunting - hard to get through -jerky movements - parent hasn't generally seen -giving more rewards more frequently -more snacks for sensory, more movement breaks __2022: -some improvement over recent past __2022: -certain sequencing she needs to complete - walk, stop, walk back, go forward again -internal and external scripting - in her own world, may not always respond -during scripting may get louder, more emotional, hard to calm down -Camp: had been in an inclusion group prior years with aide for SPED campers -but this year more problematic, needs more supports-- --now in a self-contained group __2022: -some improvement w/ Intuniv 2mg __2022: -some improvement w/ Intuniv and Prozac although some continued physical resistance in school and at times at home __2023: -continued scripting -may physically indicate 'no' 'go away' -if frustrated scripting becomes louder -overall behavior has been fair/manageable __2023: -increased impulsivity - got flour everywhere in kitchen tried doing her old laundry - at times impulsivity related to her wanting to be independent -scripting: becoming more internal -calm moments, more energetic moments __2023: -continued significant challenges with 'constant motion' (waxes and wanes, was present prior to SSRI trials as well, but has been worsening), internal and external scripting interrupting her fxing and related inattention, behavioral outbursts, aggressive behaviors (more common when around her period) -Constant motion - pacing, circles room -Gets stuck scripting/yelling/stimming - with time usually calms after 20-30 minutes - AT home more manageable. At camp/school more challenging to give her time/space. -program in her head she needs to go through before can move on - like in a trance Camp: -some screaming episodes 20-30 minutes, at times aggressive - may push/swat others __2024: -overall behavior has been better than the past, although persist being very challenging *School: -no recent aggressive episodes -some ritualistic behaviors -sleep is largest issues __2024: -participating more in class -no recent aggressive behaviors -less sleeping in class - sometimes seems to be task avoidant at times __10/2024: -Becoming somewhat more aggressive, may swat peers or staff -Respite program: needs time to deescalate, may need to remove from peers when gets upset -Ipad: -stuck on having it with her all the time -in school has to work for the Ipad - perseverates (during visit if took time loading increasing became upset, yelling -'its NOT working!!!!' and repeatedly tapping it, and hard to console) __2024: -Home: diff getting Ipad away from her. Transitioning better away from Ipad. If Ipad not working well still looking for parent phone. Still some frustration, but better, settles after a few minutes. -School: Improvement in beh in school . No more swatting staff. Still motivation issues. Still occasional outbursts with frustration -no more aggressive behaviors in general -Slow going in general, poor motivation, slow going in general  __2024: -[facility] Respite program - saying now may no longer be able to stay there, not enough support there. Parents looking now in Home CHAS[facility] to see if able to get and perhaps can go w/ her to [facility] program Tried   *Sleep: -since Dec worsening/less consistent sleep -diff settling down for sleep - wouldn't fall asleep until 1 am -some nights asleep ~9pm -Wakes: if goes to sleep later diff to wake in the morning wants to sleep until 11am, in general if goes to sleep regular time wakes around 6 -2 instances of bedwetting recently - although may have been attention seeking - was awake trying to fall asleep -Melatonin 5mg - unclear if it helps -Most nights 1-2 nights up a little later than in general __2023: -more diff going to sleep, wants to stay up and play _: -prior to sleep issues ?related to period perhaps - some nights asleep around 9 and some nights around 1am - worsened during period __2023: Sleep improved as more active over summer - off and on as well _: -bedtime routine around 9:30/10pm in bed - routine takes time as she wants to do things in her own time/way - process takes a long time. Transitions between things are very time consuming -Majority of wk not asleep until Midnight to 1-2:30AM. Most mornings have to wake her around 7am, other times she wakes on her own earlier and then takes a nap for 3 hours in school. On wknds may sleep until 10-11am. - Due to slow going in morning often parent needs to bring her to school and she gets to school late -Stopped melatonin - no major differences __2024: -likes sleeping late - sometimes impacts her getting to school on time -sleep times vary __10/2024: -hard to get up in the morning -falling asleep around MN most night, sometimes 3am, waking 9-10 am and getting to school late, hard to get up in the bed __2024: -addition of short acting guanfacine helps her fall asleep -Morning: still slow going in the morning, so getting to school late - better b/c going to sleep earlier. Not napping in school -seems tired end of school day __2024: -awake much of the night w/ the Ipad, goes to sleep late, may nap in school, Getting to school ~11am.  -more disrupted sleep since ankle injury -has tried melatonin in the past - seemed helpful   *Social: -enjoys being around her peers -takes more to engage her than in the past  *Anxiousness: -when on Zoloft somewhat calmer -a little more engaged -seems more overwhelmed at times, talking in a low tone, reduced EC -seems to be more of an issue recently __2022: -some improvement with Prozac  *Staring off: (2022) In School: There have also been instances where she stares off to the side. Her eyes move to the side and remain fixed and she does not blink. Additionally, she does not respond to or track visual stimuli brought near her such as a waving hand. Home: -havent noticed recently __2022: none recent  Private services: -began working with  [employer] program - has been helpful -music therapy - stopped -Private SLT - still attends -Respite services  *School input (24): We've noticed an increase in "no please!" which she uses to cover a lot of "annoying to her topics" - it's her way of saying "be quiet!", "too close", "I don't like that!" etc. She has been more willing to be redirected to a more specific statement that targets the external activity she doesn't like.  We've seen a great decrease in aggression - 1 swat on  and also she threw something on the floor. We have not seen anything else in terms of aggression in months!!!!!!!! Such great progress!  We notice a bit more of ritualistic behaviors the week before she gets her period. More singing, more "dancing in her seat" kind of thing, but nowhere near the amount she was doing in the beginning of the year. She transitions beautifully in the hallway and to classes. Biggest issue we have is the sleeping, but other than that she is an absolute delight! ***  SCHOOL HISTORY/DEVELOPMENTAL SERVICES:  Academics: -below grade level, closer to K level, starting to read, 12-15 sight words, can do basic addition -hard to engage to sit and work with her  *Report Card ([id number], Grade 1): -mostly 1s and 2s  School: [employer] Grade: 8th Services: -8:1:3 -SLT -OT -Social group  Past school: School Name: [employer] Grade: 5th Services: *IEP -Classification: ASD -8:1:3 -SLT: individual and small group -OT -Social group -perhaps some mainstreaming -Chorus - likely w/ an aide -Behavior intervention services - [employer] involved in her programming - monitoring and tracking her behavior in class-([facility]AS[facility] trained therapist/teacher in place/[facility]) -Parent training/counseling -ESY (although attended a summer camp program which she did well in ) -NO testing accommodations Comments: -demonstrates global developmental delays impacting academic progress -thrives on classroom routine -does well with rote questions -requires focusing prompts to answer questions in group -working with her on what quiet means and will incorporate times that acceptable to script and times were it isn't -responding to social greetings and farewells are emerging with visual support and models -establishing eye contact is an ongoing goal -needs consistent modeling and prompting to interact with her peers during social times  __ 23: School report: Naila arrived at school today calm. Around 8:50 we began transitioning down the hallway to go to the bus for our middle school visit. She began to engage in negative vocalizations that quickly turned into screams. She then laid on the bench in the front carl and continued to scream and aggress toward anyone that came in reach of her. We were able to get her calm enough to comply with standing and walking. We began slowly walking down the carl with many stops along the way (I will describe this behavior below). She continued to scream during the walk but was not aggressing. Once we got to the turn in the hallway, the principal joined us. She reminded Jd that she is okay and we are all here for her. Naila began to escalate in her screams and begin to aggress. Most of the aggression at this point was towards the principal but would switch to any person who tried to block or redirect her. The aggression included swats, hits, pushes and kicks. She was so escalated we needed to help her walk into the OT room to deescalate. Once there, she laid on the floor and we got her a pillow and a blanket. She was crying so hard she was no longer making sounds. We shut off the lights and positioned ourselves close enough to her, so she knew we were there to support her but far enough away to give her the space she needed to come back down. Once semi quiet, she earned the iPad and watched it for about 6 minutes. The timer then went off and she earned tokens for completing instructions. She was able to sit up, stand up, sit in a chair and walk to class where she earned the iPad again. From this time until approximately noon (about 2 hours), Naila was on edge with some screams and aggression but was able to complete some work. Unfortunately, around noon she began to escalate again. This episode took place in the classroom and she sat on top of her desk which was unsafe. We directed her into the work room in the back of the classroom where it was quiet and also where she would not disturb her classmates. At this point the magnitude of the aggression was very high. Kailyn and I were in the room with her with her token board and reinforcement. We tried a variety of methods, reinforcing moments of quiet, telling her she is safe and we are here to support her, ignoring behaviors, blocking and redirecting, as well as, showing her the reinforcement and providing almost immediate access for any type of compliance. None of these methods were successful. She aggressed continuously for almost 50 minutes. For reference, I was able to take 1 minute of frequency data and there were 46 aggressions in one minute. Aggression included hits to our bodies and faces, kicks to our bodies and wrapping her arms around our necks and slamming her body into us. At one point, we stepped out of the room to give her space and she continued to hit the walls and screaming while looking at us. She also went through a period of hugging me in a consoling way then stepping back and hitting me in the face then returning to hugging. This sequence happened 6 or 7 times. Eventually, we were able to guide her out of the room and back to her table where she was able to earn reinforcement and transition to lunch. The transition took about 26 minutes, and she did not make it to the lunchroom. Instead, in order to ensure she got to eat, we stopped in a one of the teacher's quiet small office spaces which is 165 feet from [facility]. Once inside she was able to sit calmly and eat her lunch. After lunch, iPad was terminated, and she began to work and earn on her regular schedule. It was then time to transition to chorus, she made it to approximately the OT room (similar distance as the previous transition) in about 23 minutes. It was at that point that we realized we needed to turn back or she would not make it back to class in time for dismissal. We return walking with her continuously stopping and scripting. Once we were in view of that same quiet small office space the TA pointed and said "Jd, look we are going back to where you ate lunch" and she very quickly walked straight to the room and sat down. She was then able to sit and work then complete her homework schedule. She walked to the classroom packed up and went outside with a positive affect. Kailyn spoke to mom and came back into the school. I saw people at the door and heard Jd yelling. I ran down and went outside. Kailyn notated that she was walking to the front of the building and saw her starting to engage in behavior so she went back. Upon my arrival there were 4 staff there and mom monitoring the street due to  time and her safety. Kailyn was blocking her head as she was laying on the cement. Kalina, classroom SLP, ran inside to get a bean bag to put under her head, again for safety purposes. We directed the other staff to stand out of sight but to continue to monitor the road and people walking by. She eventually stood up and with many prompts walked to the car which was parked in the back of the building. The whole process took about 45 minutes. The scripting behavior I am referring to throughout the summary is a newer behavior. It is a specific script where she stops, puts her hands to her mouth likes she is yelling and produces an unintelligible script, lifts her hands above her head as if she is yelling then on her hips like she is angry. She then spins around and looks at the floor. Sometimes she will complete this one time and continue walking/ working. Other times she has to complete this 6 or 7 times in a row before getting started.  *School note (2023): -recently demonstrating significant increase in vocalizations/involuntary movements leading to a loss of fx in school -for past 2 months these have been debilitating for Naila -do not appear behavioral or sensory in nature - sensory/behavioral plans have not been successful -behaviors occurring in all settings/all teachers, preferred and non-preferred activities, resting times = do not appear behavioral or self-stim in nature -she tries to work through these involuntary mvments to complete her work or participate in activities she enjoys and she cannot - gets frustrated if times runs out b/c wanted to finish preferred task but body isn't letting her -quick head tilts, shoulder shrugs, freezing during a mvmt, shuffling gait, screaming, vocalizations, kicking, muscle twitches - data shows occurring every 3-20 seconds -diff with FM tasks, diff eating/opening packages, cant participate in gym activities she could in the past, diff participate in new learning activities -issues since ~January  *22 Behavioral Consultant provided info Overall, there has been a decrease in the upset/"emotional" episodes that typically included aggression, property destruction and screaming. The week before she was sick there was an increase in screaming and aggression. There is the possibility that it may have been due to not feeling well ahead of testing positive. She continues to engage in high rates of self-stimulatory behaviors and non-contextual vocalizations that interrupt her ability to engage in academic tasks. These behaviors occur throughout the day and occur during leisure and preferred activities as well. They are not restricted to times of academic tasks or non-preferred demands. We continue to see non-compliance during times of academic tasks and demands. These can present as task avoidance, but also includes times when we have difficulty refocusing and redirecting her back to tasks. There are times that she appears as "stuck" within her self-stimulatory and scripting behaviors and does not appear aware of her surroundings or directions being given. When she presents as more "stuck" her motor movements appear to be brief and jerky (i.e. a quick extension and retraction of her arm out in front of her) and her vocalizations are generally more sound or grunt based versus intelligible words or tv scripts. There have also been instances where she stares off to the side. Her eyes move to the side and remain fixed and she does not blink. Additionally, she does not respond to or track visual stimuli brought near her such as a waving hand. please let up know if there's any additional information or anything we can provide more examples of or elaborate on  In past: Goes into 12:1 for a morning meeting, and mainstreamed for some activities Does well with structured routine. Fairly independent in some settings. Mainstreamed largely for socialization opportunities. Some mainstreaming with an aide to help with games, sharing and conversation - -may need prompts to respond or initiate a greeting.  : School Name: [employer] through [employer], 2 years Grade:  Services: -1st year in  - 10 students -2nd year in  - 8:1  *Behavioral Consult Progress report [facility] Autism ([id number], 2019): -improving ability to tolerate change in routine, redirect, and interrupt preferred play -working on working quietly   *[facility] Speech/Language Hearing clinic (treatment plan 6:6-6:10yo yo, 2019): -working on functional communication, play skills, Pragmatic skills, answering 'wh' questions  *Private Services: -speech therapy at [facility] intermittently -Music therapy -Havent pursued private CHASITY services  *Teacher intake form (2021, 3rd grade): -Self-contained, OT, SLT -requires token economy to keep motivated and on task -seatwork: needs teacher prompt, w/o support work is illegible or incomplete -Academics: failing -Reading: = K level, Math = K level -Social: doesn't seek peers/adult relationships, will sit and parallel play but requires a lot of teacher support to interact appropriately  *Teacher intake form (3/2020): -self-contained, 7 students, multiple other staff, K-2 class, in 2nd grade -SLT -OT -Beh intervention services -highly benefits from CHASITY / discrete trials -thrives with routines, follows most directions -difficulty with changes in routine, generalizing skills, constant refocusing prompts -token board for motivation and on task behavior -Social: peers from Gen ed class seek her out, her interactions with these children are adult led, most of the time she requires prompting to respond back/engage appropriately -recently developed a sense of humor -Academics: below average - 2 or more yrs below grade level -Reading: not retaining learned words or generalizing words across difft materials -Math: K level -Writing: cant spell without visuals, needs some guides to stay on the line -Seatwork: inconsistent - at times needs significant prompting - other days - still requires prompting but not as much   PREVIOUS ASSESSMENTS/SERVICES:  Early Intervention: no services  Private speech therapy around 2 years old.  Earlier evaluations: reportedly wasn't classified as having autism  CURRENT FUNCTIONING/HISTORY OF PRESENTING CONCERNS:  *********AS noted during her initial consultation****   Concerns noted on our intake paperwork include: -poor speech/communication and social skills -makes limited eye contact -bright, comprehends more than she says verbally -difficulty interacting with others- jonathan. verbally - likes to be around other children but mostly parallel plays or needs prompting to interact verbally -she understands much more receptively -she can give appropriate responses with visual and verbal cues -lacks patience and can become frustrated when things don't go her way -responds best to routines/structure  Today's concerns: -autism concerns -ADHD concerns  Concerns: -limited eye contact -difficulty socializing -repetitive movements -perseverations -some compulsive behaviors -everything has a place - need to move something back if not in its own place-table needs to be placed in an exact manner -puts toys exactly / same position and location as she found it   Language/Communication: -Shared enjoyment/joint attention: likely decreased -turns to others for help -Response to name: better now than in the past, reduced in the past -Eye contact: good with requests from her, other times reduced or needs prompts -able to follow multiple step instructions -able to speak in sentences - receptive seems stronger than her expressive language, sometimes speaks in clear sentences, other times less well developed -often scripting  Behavioral: -ADHD: a number of concerns, impulsive, highly distractible, may need multiple prompts -frustrated easily at times -improving with sharing and transitions -still issues with calming when upset, fewer meltdowns than in the past  Emotional: -anxiousness/resistance at doctor's office.  Social/Play Skills: -scripts frequently when playing -play somewhat appropriate - demonstrates pretend play with play dolls- has 2 dolls in her arms or near her often when she is home (not in school) -some appropriate play with toys at home -demonstrates some pretend play with kitchen set -difficulty taking turns - timer seems to help -some social interest in peers - on her terms -very connected to her older sister - responds well to Soheila her older sister - somewhat of a jokester and can  on jokes quicker than her older sister  Atypical Behaviors/Sensory: -hand flapping: rarely -in past: spun herself around in circles -walks around in circles/laps around the house -may watch same scene of a video repetitively - may want parent to repeat something she says in regards to the video - may repeat saying it until validated -Focus on wheels: no -no toe walking -stands close to TV -difficulties with changes in routine and transition issues -some visual peering/unusual visual regard - usually with TV -generally no visual inspection -play can often be in a set pattern - can get upset if someone wants to play in a different manner -may chew banana and then spit it out into garbage due to texture issues -tactile: sensory seeking - when watching TV may touch the screen, no major issues with clothing, likes deep hugs when upset, often makes use of sensory room in school -may repetitively watch the same show in the same way -often does 'laps' around the house - jonathan. when anticipating something -Loud noise: generally no issue  Motor Abilities: -fair  ***********   *MEDICAL HISTORY:  Current Medications: -Prozac 30mg - increased in Oct -Guanfacine ER 3mg at night - around 6:30pm - no impact on sleep -Guanfacine 1mg early evening -On HOLD - MPH 2.5mg (started around beginning 2024, later stopped, restarted when began struggling in camp)- stopped summer 2024 -ON HOLD - FXR 5mg: only for a few days and a lot of other changes correlating. No major benefit. No major SE - 10/2024 - haven't retrialed -Claritin prn  Medication History: - swallows  *GIOVANNY 10 mg (2020-~2021): -10mg - unclear duration / benefit/SE -~2021 - trialed 20mg - unclear effects/SE - correlated with change in her usual routine. Seemed that worsened sleep, perhaps made her less interested in food, perhaps more scripting/rituals and more common meltdowns jonathan in afternoon around time medication thought to perhaps be wearing off --- although behavioral issues also noted on days that wasn't taking the medication so unclear if she truly failed GIOVANNY/stimulants - may trial again in future. Perhaps was quieter at times in school w/ medication  *Guanfacine 1mg (21 -2022, ~2022 restarted and later STOPPED 2022, 2024- ): -2021: 1mg BID - clear benefit noted, calmer, more cooperative, more engaged -2021: to trial small increases -2022: stopped, and shortly after increased Lexapro to 7.5mg - more active, impulsive, eating more, some more diff falling asleep -2022: restarted and later stopped -~2022-2023 - trial 1mg before bed as sleep aide, didn't help - stopped -2024: to retry as sleep aide -2024: helped with sleep 1mg at night -but parent concerned seem to correlate w/ new stim/tic like behavior and stopped, might retry -2024: benefit with sleep initiation w/ 1mg  *Zoloft 25 mg tab (2021 -10/2021 stopped): -seemed somewhat zoned out, at times somewhat sleepy in class/drift off, napped when usually didn't nap -once stopped Zoloft - more scripting, more circling around, more repeating, repetitive, scripts the same story, moving hands in set way, gets emotional at times and yells out - tab  *Concerta 18mg (~-10/2021 -for a few days and stopped, trialed 3 days - can consider trialing again at some point): -more meltdowns, on and off throughout the day, emotionally reactive/less easily redirectible -aggressive, yelling, agitated, hit, more physical -Duration: lasted most of the day, less than 12 hours  *Vyvanse 10mg (2021 -2022): -more outbursts, throughout day but mostly as wearing off -lasted a few hours, somewhat calmer -more scripting, more distracted by internal stimuli -Sleep: no impact -Diet: slight impact - less snacking  *Lexapro (2022-2022): -to trial to address outbursts, scripting, and behavioral challenges -2022: trialing 7.5mg - some decrease in internal scripting noted, seems happy -3/2022: increased to 10mg -2022: trial increase to 15mg -2022:-weaned off, as weaned off more impulsive behavior, more scripting, increased appetite, more rigid. Parents stopped as had continued with emotional outbursts - meltdowns in school, internal and external scripting. Not helpful enough and continued outbursts.   *AXR 5mg(3/2022-~2022): - unclear impact, some diff with sleep but manageable, perhaps fewer outbursts -3/31/22: trialed increase to 10mg - 'seemed off' and returned to 5mg -2022: trialed 10mg - more outbursts and scripting - stopped.   *Guanfacine ER (2022-): -2022: taking 2mg with noted benefit __2023: 3mg nightly __2024: tried going down to 2mg but worse sleep, so returned to 3mg __10/2024: recently increase to 4mg - no increase in benefit or SE __2024: returned to 3mg as 4mg wasn't any better  *Prozac 10mg (~2022-): -benefit noted -more engaged, less scripting, calmer -2023: trialed to 15mg - but noted tics and returned to 10mg. Benefit noted (discussed tics likely unrelated) -2023: parent increased to 15mg again -2023: 30mg daily -2024: reduced to 15mg - at 15mg beneficial, at 10mg insufficient benefit -2024: 15mg __10/2024: recent increase to 20mg __2024: increased to 30mg w/ some behavioral improvement   *MPH 2.5mg (~2024-): -seems to be helping her stay awake in school  *FXR 5 (2024-stopped after few days): -tried for about one wk -no major difference -perhaps scripting more   Allergies: -none  UPDATED PAST MEDICAL HISTORY: There have been no recent major medical changes.  Birth History: -born FT, 6.13lbs -regular delivery  ROS: A 10-point review of systems was performed. No concerns regarding vision and hearing. No cardiovascular, respiratory, gastrointestinal, renal, endocrine, neurologic, musculoskeletal, or dermatologic concerns.  *[place] evaluation Dr. Wilver William - Chief developmental pediatrics: worked on ways to address her behavior: reportedly impression was that Naila didn't have autism.  Summer 2020: had episode of passing out - hospitalized and evaluated by Cardiology and Neurology. Neurology considering/ordered brain MRI. No seizure activity found  *20: -Reviewed inpatient Cardiac Consultation note (hospitalized following episode of syncope) *Cardiology (2020, Vee Huff and Elida Golden): -no cardiac contraindications for starting ADHD medication  Neurology (): -EEG performed -Tic like behaviors have improved/lessoned -MRI normal  Audiology (2-3 yo, Dr. Osborn): hearing thought to be normal, MT placed  Vision: none  Hospitalizations/ Surgeries: -MT placed   FAMILY HISTORY: Her father has a MA and is an . He received Speech services when younger. Generally healthy. Her mother has a MA and is a . She received Speech services when younger. Has type II diabetes - perhaps Type 1. Thyroid issues. She has a sister (Sohelia, ). Soheila is also followed in our center  There is an extended family history of: -ADHD, learning problems, speech delay, borderline range of intellectual functioning: sister -Bipolar: Paternal uncles, PGM -Schizoaffective: Paternal uncle, lives in a special group home/residential program, also "simple-minded" - he reportedly trialed Prozac and it made him psychotic - requiring hospitalization - improved once stopped -Some easy distraction: father -type II diabetes: maternal side of family -heart murmur: mother, father, and other family members -Fatal Cardiac arrest: uncle, was living in adult independent residence, 50 yo  There is no history of heart rhythm problems or of sudden death.  SOCIAL HISTORY: -lives with parents and sister  PE (23 in person): -well appearing -S1S2 RRR -CTA b/l -FROM of extremities -no consistent muscle rigidity, no clear cogwheel rigidity -no clonus appreciated -fair tone and strength -EOM appeared intact __10/2024: -S1S2 RRR  Observations:  (Tele 21): -calm during visit, frequent verbalizations to herself, holding her hand up with perhaps visual inspection, stopped and greeted me with prompt, pacing around the home, cooperative (Tele 3/31/22): -calmer and more cooperative appearing - mother called her over and she complied, and when prompting said hello with associated EC (in person 2023): -frequent almost barking like vocalizations, and other noises while in waiting room - less frequent but still occurring intermittently during 70 min time in room -neck twitching, shoulder to ear, leg movements, face grimacing - also myoclonic in appearance - also occurred more freq in waiting and less common when involved w/ activity in office -intermittent shuffling gait/pause when walking and then continue walking more fluidly -when walking from waiting room, in office able to walk back and forth across room when requested fluidly -mostly calm during visit and involved in playing with a bin of toys - involuntary movements and vocalizations significantly decreased for a good portion of the visit - for around ~40 minutes few if any were noted, but as visit progressed were some episodes of these movements and vocalizations - usually lasting less than 30 seconds -crayons fell on floor - she wanted to pick them up and put back on table -one toy that was missed when had put away toy bin - despite redirection she went around doctor to put back in bin - seemed driven to do so -answered some questions - yes/no when given options of what to play with -cooperative with vital signs and exam ( 2024): -social rapport not established -intermittently gently touching eye with fingers and vocalizing, intermittent repetitively saying no -self-directed   * LABORATORY/TEST RESULTS/DEVELOPMENTAL ASSESSMENTS:  ***Testing as recorded in her IEP: (2018): __Goldman Fristoe Test of Articulation 3: -Sounds in Words: 69 __Expressive One Word Picture Vocabulary Test-4 (EOWPVT-4) (SS): 14 % __Receptive One Word Picture Vocabulary Test-4 (ROWPVT-4) (SS): 90 __Preschool Language Scale-5 (SS)(SD of 15): -Auditory Comp: 67 -Expressive Comm: 64 -Total Language: 63 __ABAS (Adaptive Behavior Assessment System-III) - Teacher: -General Adaptive Composite: 1 % -Gayle Chuy Tests of Achievement IV (SS) (SD = 15): -Many scores noted as "Not scorable" -Letter Word Identification: 84 -Passage Comprehension: 45 -Reading : 65 -Broad Readin -Basic Reading Skills: 82  -Applied Problems: 51  -Spellin -Broad Written Language: 58 -Basic Writing skills: 84 -Written Expression: 65  Cognitive assessment: reportedly attempted in the past but unable to complete testing secondary to behavior.  *Psychology Eval (2.10 yr): -difficult to engage -very short attention span -looked at herself in mirror for 3 minutes -difficulty cooperating with requests -inconsistent eye contact -often ignored those around her and needed support to engage and attend -FSIQ could not be obtained, non-compliant   (3/2020) __Child and Adolescent Symptom Inventory-5(DAVID-5):  Informant: Parent -inattention:  -hyperactivity/impulsivity: 3/9 -oppositional and defiant behaviors:  -feels compelled to perform unusual habits: often -makes vocal sounds for no apparent reason: often -social deficits/restricted and repetitive interests/behaviors: a number endorsed No other significant symptoms of any emotional or behavioral disorder  Informant: Teacher -inattention:  -hyperactivity/impulsivity:  -oppositional and defiant behaviors:  -Very often: -feels compelled to perform unusual habits, rocks, makes vocal sounds for no apparent reason -social deficits/restricted and repetitive interests/behaviors: many endorsed No other significant symptoms of any emotional or behavioral disorder Comments: -overall pleasure to have in class, happy girl -has been presenting with more anxiety compared to last year -repetitive movements - rocking, vocalizations, scripting - and rigidity - often impede her ability to learning and retain info, as well as generalize skills -thrives on routine/structure - if routine changed - can have tantrum and be difficult to redirect - screaming, crying, some staff aggression  [place] Assessment  -Informant: Caregiver on Intake Form -Inattention:  -Hyperactivity/Impulsivity:    (2021) Guanfacine 1mg BID [place] Assessment: -Informant: Teacher -Inattention:  -Hyperactivity/Impulsivity: 3/9 -Academics: problematic.

## 2025-02-14 NOTE — PLAN
[FreeTextEntry3] : 1. Follow up is scheduled in 2-3 months  2. Medication:  *CURRENT: -Intuniv: Current 3 mg -continue (no increased benefit on past trial of 4mg) -Fluoxetine 40mg: increase to 50mg. If fails to wean and consider trialing alternate such as Zoloft -Short acting Guanfacine: Continue 1mg prior for sleep initiation. Can shift closer to bedtime and if needed increase to up to 2mg -MPH 5mg in AM: Can continue using in morning 5mg to help her get on task in the morning. Can trial higher doses and multiple dosing per day if desired. Targeting Motivation/slow going issue. Can consider using multiple times daily if good response or retrialing XR -FXR 5mg: Previously noted can retry in future once back to usual routine - can trial increase in 5mg increments depending on response/SE. Monitor scripting. If unavailable trial [place] ER if covered. Target: impulsive behaviors/ADHD sxs/behavioral issues - Parents haven't retried -ABILIFY - multiple times have discussed Abilify indicated and is an option. Has been evaluated by Endo already given fmhx of DMPiotr Yoo does have a hx of atypical movements which vary overtime while not on atypical antipsychotics, which may complicate monitoring for SE - although these movements have decreased significantly recently  *2/2025-Have discussed multiple times my recommendation to begin medication management with Child Psychiatry given complexity of her case and ongoing challenges. Offered direct referral to [facility] and provided info of other routes to pursue. Can also consider school related urgent beh health at [place]. Parents have opted to not to pursue as of yet.  3. OPWDD: submitting application  4. Home CHASITY: organizing  We discussed effects/SE of: - Stimulants (for her behavior and ADHD) -Alpha agonists (for behavior and ADHD) -SSRI's (for her anxiousness, rigidness, behavior, easy frustration, rituals, scripting). [email or web address] - ADHD  5. Sleep: __1/2024: -significant challenges - to trial short acting guanfacine as sleep aide -recc scheduled in Sleep clinic eval in case needed __11/2024: -discussed shifting bedtime as tolerated slowly earlier hopefully leading to waking earlier and getting to school earlier -if upcoming med changes aren't helpful Sleep medicine consult can be considered -noted FMLA may be appropriate __12/2024: -consider trialing up to 5mg melotonin -adjustments to short acting Guanfacine as above -diff at night separting from IPad to go to sleep, tends to watch videos on IPad and do other activities. Consider TV in background to replace Ipad and perhaps easier to transition away from Ipad and help her fall asleep sooner -[facility] sleep medicine consult - info provided  6. Home CHASITY: __11/2024: -if needed contact for brief visit for CARS -may be helpful, can work on sleep and behavior,  from Ipad Previous Recommendations: *Autism reccs *ADHD reccs *Social skills groups __12/2024: -dx letter provided per request as part of process looking into Home CHASITY. If needed to schedule time for CARS  It was a pleasure to work with Naila and her family today. Please do not hesitate to contact Dr. Vasques at [phone] with any other further questions or concerns.

## 2025-02-14 NOTE — REASON FOR VISIT
[Home] : at home, [unfilled] , at the time of the visit. [Medical Office: ([facility])___] : at the medical office located in  [Telehealth (audio & video)] : This visit was provided via telehealth using real-time 2-way audio visual technology. [FreeTextEntry1] :   [phone] Mom cell  I had the pleasure of meeting with Naila Souza's parents, for a follow up appointment.  Introduction:  Initial Consultation Assessment (2020): Naila Souza presented for consultation for concerns for Autism and ADHD. This consultation was performed by phone given COVID-19.  *Autism concerns: The following is reported: -reduced eye contact, reduced response to name -speech delay -reduced shared enjoyment and joint attention -repetitively may watch part of a video -perserverates -some compulsive tendencies - everything has its place, upset if something isn't in its place -unusual visual regard -spun herself around when younger, 'laps' done around the home -holds 2 dolls with her much of the time she is at home -scripts -somewhat of a social interest but struggles with cooperative play -social pragmatic weaknesses -transition difficulties -self-directed  *ADHD: -impulsive -easily distracted -active  *Academics: -below grade level -reportedly past attempts at cognitive assessments were not successful  Overall Naila's presentation is consistent with the following:  *Autism Spectrum Disorder (requiring substantial support) (given that this consultation was not performed in person, a definitive diagnosis will be given during a follow up visit, but we discussed that by report her presentation is consistent with Autism)  *ADHD-Combined presentation. We discussed that in a self-directed individual, self-direction vs. inattention often can be difficult to decipher and that this requires continued monitoring, but a diagnosis is given at this time.  *Neurodevelopmental Disorder: this is reflective of her learning challenges and suspect cognitive challenges, in the absence of robust cognitive testing. Her learning profile and cognitive abilities require continued monitoring and evaluation overtime. Currently her cognitive abilities are unknown.  INTERIM HISTORY:   *Endo (2023): -mood changes with menses - can consider seeing a gynecologist to manage -normal A1C, do not have concerns regarding early Type 1 diabetes, antibodies for Type 1 DM negative -should not be at increased risk of DM type 1 on top of fmhx -f/u as needed  Menstrual periods have become more regular since 2024 (first period 2022)and this has been helpful with behavior.  Transitioned to MS fairly  Current Regimen: -Prozac 40mg -Guanfacine ER 3mg at night - around 6:30pm - no impact on sleep -Guanfacine 1mg prior to bed -MPH 5mg  -FXR 5mg: only for a few days and a lot of other changes correlating. No major benefit. No major SE - 10/2024 - haven't retrialed -Melatonin 5mg  *MPH 2.5mg: __2024: -take in morning -seems to keep her awake more in school -and seems calmer in general __10/2024: -perhaps tried up to 5mg, didn't try long enough to get a sense of response __(started around beginning 2024, later stopped, restarted when began struggling in camp)- stopped summer 2024.  __2025 - Taking 5mg in the morning - seems to help her get going.  *Guanfacine 1mg: __2024: -had been trialing to help with sleep, seemed to help but noted increase in tic like behavior so stopped it -tic like beh was squinting her eyes, hand over eyes and shaking fingers, repetitive exp noises - at times can interrupt it, other times got stuck on it and didn't want to be disturbed -seems to have decreased in frequency overtime __10/2024: -havent retried prior to bed __2024: -1mg prior to bed seems helpful getting her to go to sleep earlier and waking somewhat earlier  *Fluoxetine 10mg: -benefit noted -less scripting, less defiance, speech is clearer, more engaged when speech with her, quieter, calmer, more accepting of being redirected -more focused and engaged with activities __2023: -trialed 15mg but then returned to 10mg as noted tic like behavior and worried might be related __2023: -retrying 15mg - no major difference __2023: -unclear level of benefit, likely somewhat beneficial -correlated with recent increase in constant motion - in past has also had similar behavior but more recently more prominent - unclear if SE of the medication __2024: -tried weaning off down from 35, got down to 10mg but more emotional dysregulation, now on 15mg and seems to be fair. On 35mg more 'in a fog' -benefit with 15mg of Prozac __2024: -15mg __10/2024: -In Sept increased to 20mg __2024: -increase to 30mg with noted improvement - perhaps increase in facial tics but improved after a couple of wks __2025: -taking 40mg. - less agitated - still may get upset but not as pronounced  *Guanfacine ER 1mg: -continued outbursts and behavioral issues -Few days ago (as of 22) increased to 2mg - calmer, happier, more engaged - doing better at [employer] -still rigid -towards afternoon more impulsive, more impulsive eating -last night (22) diff falling asleep/woke up during the night __2022: -taking Guanfacine ER 2mg at night -less defiance, less outbursts __2023: -continuing 2mg nightly __2023: -after last visit increased to 3mg - unclear if any major difference __2024: -Guanfacine ER 3mg - tried going down to 2mg but worse sleep, so returned to 3mg __2024: -3mg __10/2024: -recently increased to 4mg - no major SE and no increased benefit __2024: -returned to 3mg as no increased benefit on 4  *Involuntary movements and vocalizations: __Neck twitch: a few beats of ear to shoulder - can happen when watching video, more common if scripting -more common since sick mid 2022 -a few times a day -doesn't seem to bother her although school thinks perhaps uncomfortable for her -no major pattern -wax and wane - school noticing more __2023: -shuffling gait -happening throughout the day, a few minutes at a time, variable - jerky movements, intermittent vocalizations and shuffling gait - more common if not busy, more common when sitting around or watching video -behaviors impact her from doing behaviors she wants to participate in or even eating -taking longer to eat due to the jerking/vocalizations -behaviors don't seem to bother her clearly although parents question if the grimacing is an expression of discomfort __2023: -significant decrease in these behaviors - evaluated by Neuro __2024: -involuntary movements have significantly decreased -shuffling gait has stopped -walks more fluently but covers her eyes __2024: -tic like beh was squinting her eyes, hand over eyes and shaking fingers, repetitive exp noises - at times can interrupt it, other times got stuck on it and didn't want to be disturbed -seems to have decreased in frequency overtime   Communication: __2024: -may verbally protest -often speech not very clear -Speech very clear when Ipad not working and asking for assistance  *Rigidity/rituals/atypical behaviors/RRB: -variable, inconsistent -very rigid how she does things and how she places things -watches video clips repetitively -arranges dolls in particular fashion -set rituals __2024: -cycles of stereotyped behaviors -vocalizing and gently putting finger tips to right eye -vocalizing no no no no -periodic episodes of internal scripting __2025: Diff w/ transitions/ritualistic/rigid - house to car, nighttime routine of showers, daily routine needs - taking longer than the past - seems to be more of an issue at home than in school. Challenge in school as well. Good reports from school overall. Once out of bed quickly gets ready - coat on/hat on/on couch - looking at ipad - if encourage her to go - slows her further/needs to complete what doing on Ipad further. Showering in morning instead of night as similar issue at night with showering. More pronounced issue last few weeks.  *Behavior: -zoning out more -more episodes of aggression -more scripting -less engaged -noted at home and in school -more defiance -harder to focus and redirect -Teachers reportedly note (2021): easily distracted -internal/external, scripting throughout the day, many ritualistic behaviors, if changes in routine -defiant, aggressive, property destruction, visual cues seems to help redirect her, throughout the school day, needs token economy system, behaviors interfering her progress, working on group skills. When focused makes more progress. Academically behind. Messy handwriting. Needs a lot of repetition -Home: does laps around the house, was cooperative during recent physical, scripting, directable at times, at times gets more emotional - gets frustrated at times but short lived. Talks under her breath/in a whisper. Slow w/ morning routine, but tries following routine. __2022: -worse behavior with Vyvanse __3/ School: -still diff focusing -fewer outbursts but still present -body jerking, vocal grunting - hard to get through -jerky movements - parent hasn't generally seen -giving more rewards more frequently -more snacks for sensory, more movement breaks __2022: -some improvement over recent past __2022: -certain sequencing she needs to complete - walk, stop, walk back, go forward again -internal and external scripting - in her own world, may not always respond -during scripting may get louder, more emotional, hard to calm down -Camp: had been in an inclusion group prior years with aide for SPED campers -but this year more problematic, needs more supports-- --now in a self-contained group __2022: -some improvement w/ Intuniv 2mg __2022: -some improvement w/ Intuniv and Prozac although some continued physical resistance in school and at times at home __2023: -continued scripting -may physically indicate 'no' 'go away' -if frustrated scripting becomes louder -overall behavior has been fair/manageable __2023: -increased impulsivity - got flour everywhere in kitchen tried doing her old laundry - at times impulsivity related to her wanting to be independent -scripting: becoming more internal -calm moments, more energetic moments __2023: -continued significant challenges with 'constant motion' (waxes and wanes, was present prior to SSRI trials as well, but has been worsening), internal and external scripting interrupting her fxing and related inattention, behavioral outbursts, aggressive behaviors (more common when around her period) -Constant motion - pacing, circles room -Gets stuck scripting/yelling/stimming - with time usually calms after 20-30 minutes - AT home more manageable. At camp/school more challenging to give her time/space. -program in her head she needs to go through before can move on - like in a trance Camp: -some screaming episodes 20-30 minutes, at times aggressive - may push/swat others __2024: -overall behavior has been better than the past, although persist being very challenging *School: -no recent aggressive episodes -some ritualistic behaviors -sleep is largest issues __2024: -participating more in class -no recent aggressive behaviors -less sleeping in class - sometimes seems to be task avoidant at times __10/2024: -Becoming somewhat more aggressive, may swat peers or staff -Respite program: needs time to deescalate, may need to remove from peers when gets upset -Ipad: -stuck on having it with her all the time -in school has to work for the Ipad - perseverates (during visit if took time loading increasing became upset, yelling -'its NOT working!!!!' and repeatedly tapping it, and hard to console) __2024: -Home: diff getting Ipad away from her. Transitioning better away from Ipad. If Ipad not working well still looking for parent phone. Still some frustration, but better, settles after a few minutes. -School: Improvement in beh in school . No more swatting staff. Still motivation issues. Still occasional outbursts with frustration -no more aggressive behaviors in general -Slow going in general, poor motivation, slow going in general  __2024: -[facility] Respite program - saying now may no longer be able to stay there, not enough support there. Parents looking now in Home [facility] to see if able to get and perhaps can go w/ her to [facility] program Tried   *Sleep: -since Dec worsening/less consistent sleep -diff settling down for sleep - wouldn't fall asleep until 1 am -some nights asleep ~9pm -Wakes: if goes to sleep later diff to wake in the morning wants to sleep until 11am, in general if goes to sleep regular time wakes around 6 -2 instances of bedwetting recently - although may have been attention seeking - was awake trying to fall asleep -Melatonin 5mg - unclear if it helps -Most nights 1-2 nights up a little later than in general __2023: -more diff going to sleep, wants to stay up and play __2023: -prior to sleep issues ?related to period perhaps - some nights asleep around 9 and some nights around 1am - worsened during period __2023: Sleep improved as more active over summer - off and on as well __2024: -bedtime routine around 9:30/10pm in bed - routine takes time as she wants to do things in her own time/way - process takes a long time. Transitions between things are very time consuming -Majority of wk not asleep until Midnight to 1-2:30AM. Most mornings have to wake her around 7am, other times she wakes on her own earlier and then takes a nap for 3 hours in school. On wknds may sleep until 10-11am. - Due to slow going in morning often parent needs to bring her to school and she gets to school late -Stopped melatonin - no major differences __2024: -likes sleeping late - sometimes impacts her getting to school on time -sleep times vary __10/2024: -hard to get up in the morning -falling asleep around MN most night, sometimes 3am, waking 9-10 am and getting to school late, hard to get up in the bed __2024: -addition of short acting guanfacine helps her fall asleep -Morning: still slow going in the morning, so getting to school late - better b/c going to sleep earlier. Not napping in school -seems tired end of school day __2024: -awake much of the night w/ the Ipad, goes to sleep late, may nap in school, Getting to school ~11am. -more disrupted sleep since ankle injury -has tried melatonin in the past - seemed helpful __2025: -has been going to sleep earlier - improved sleep - but still diff getting her out of bed in the morning - may not get to school until midday   *Social: -enjoys being around her peers -takes more to engage her than in the past  *Anxiousness: -when on Zoloft somewhat calmer -a little more engaged -seems more overwhelmed at times, talking in a low tone, reduced EC -seems to be more of an issue recently __2022: -some improvement with Prozac  *Staring off: (2022) In School: There have also been instances where she stares off to the side. Her eyes move to the side and remain fixed and she does not blink. Additionally, she does not respond to or track visual stimuli brought near her such as a waving hand. Home: -havent noticed recently __2022: none recent  Private services: -began working with  retired [employer] program - has been helpful -music therapy - stopped -Private SLT - still attends -Respite services -Home [facility] - 2025 - organizing, awaiting therapist to be assigned. Will try to transition back to after school program with [facility].    *School input (24): We've noticed an increase in "no please!" which she uses to cover a lot of "annoying to her topics" - it's her way of saying "be quiet!", "too close", "I don't like that!" etc. She has been more willing to be redirected to a more specific statement that targets the external activity she doesn't like.  We've seen a great decrease in aggression - 1 swat on  and also she threw something on the floor. We have not seen anything else in terms of aggression in months!!!!!!!! Such great progress!  We notice a bit more of ritualistic behaviors the week before she gets her period. More singing, more "dancing in her seat" kind of thing, but nowhere near the amount she was doing in the beginning of the year. She transitions beautifully in the hallway and to classes. Biggest issue we have is the sleeping, but other than that she is an absolute delight! ***  SCHOOL HISTORY/DEVELOPMENTAL SERVICES:  Academics: -below grade level, closer to K level, starting to read, 12-15 sight words, can do basic addition -hard to engage to sit and work with her  *Report Card ([id number], Grade 1): -mostly 1s and 2s  School: [employer] Grade: 7th Services: -8:1:3 -SLT -OT -Social group -BCBA - parent training intermittently - helpful   Past school: School Name: [employer] Grade: 5th Services: *IEP -Classification: ASD -8:1:3 -SLT: individual and small group -OT -Social group -perhaps some mainstreaming -Chorus - likely w/ an aide -Behavior intervention services - BCBA involved in her programming - monitoring and tracking her behavior in class-([facility] trained therapist/teacher in place/JEREMÍAS) -Parent training/counseling -CHUYY (although attended a summer camp program which she did well in ) -NO testing accommodations Comments: -demonstrates global developmental delays impacting academic progress -thrives on classroom routine -does well with rote questions -requires focusing prompts to answer questions in group -working with her on what quiet means and will incorporate times that acceptable to script and times were it isn't -responding to social greetings and farewells are emerging with visual support and models -establishing eye contact is an ongoing goal -needs consistent modeling and prompting to interact with her peers during social times  __ 23: School report: Naila arrived at school today calm. Around 8:50 we began transitioning down the hallway to go to the bus for our middle school visit. She began to engage in negative vocalizations that quickly turned into screams. She then laid on the bench in the front carl and continued to scream and aggress toward anyone that came in reach of her. We were able to get her calm enough to comply with standing and walking. We began slowly walking down the carl with many stops along the way (I will describe this behavior below). She continued to scream during the walk but was not aggressing. Once we got to the turn in the hallway, the principal joined us. She reminded Jd that she is okay and we are all here for her. Naila began to escalate in her screams and begin to aggress. Most of the aggression at this point was towards the principal but would switch to any person who tried to block or redirect her. The aggression included swats, hits, pushes and kicks. She was so escalated we needed to help her walk into the OT room to deescalate. Once there, she laid on the floor and we got her a pillow and a blanket. She was crying so hard she was no longer making sounds. We shut off the lights and positioned ourselves close enough to her, so she knew we were there to support her but far enough away to give her the space she needed to come back down. Once semi quiet, she earned the iPad and watched it for about 6 minutes. The timer then went off and she earned tokens for completing instructions. She was able to sit up, stand up, sit in a chair and walk to class where she earned the iPad again. From this time until approximately noon (about 2 hours), Naila was on edge with some screams and aggression but was able to complete some work. Unfortunately, around noon she began to escalate again. This episode took place in the classroom and she sat on top of her desk which was unsafe. We directed her into the work room in the back of the classroom where it was quiet and also where she would not disturb her classmates. At this point the magnitude of the aggression was very high. Kailyn and I were in the room with her with her token board and reinforcement. We tried a variety of methods, reinforcing moments of quiet, telling her she is safe and we are here to support her, ignoring behaviors, blocking and redirecting, as well as, showing her the reinforcement and providing almost immediate access for any type of compliance. None of these methods were successful. She aggressed continuously for almost 50 minutes. For reference, I was able to take 1 minute of frequency data and there were 46 aggressions in one minute. Aggression included hits to our bodies and faces, kicks to our bodies and wrapping her arms around our necks and slamming her body into us. At one point, we stepped out of the room to give her space and she continued to hit the walls and screaming while looking at us. She also went through a period of hugging me in a consoling way then stepping back and hitting me in the face then returning to hugging. This sequence happened 6 or 7 times. Eventually, we were able to guide her out of the room and back to her table where she was able to earn reinforcement and transition to lunch. The transition took about 26 minutes, and she did not make it to the lunchroom. Instead, in order to ensure she got to eat, we stopped in a one of the teacher's quiet small office spaces which is 165 feet from [place] [id number]. Once inside she was able to sit calmly and eat her lunch. After lunch, iPad was terminated, and she began to work and earn on her regular schedule. It was then time to transition to chorus, she made it to approximately the OT room (similar distance as the previous transition) in about 23 minutes. It was at that point that we realized we needed to turn back or she would not make it back to class in time for dismissal. We return walking with her continuously stopping and scripting. Once we were in view of that same quiet small office space the TA pointed and said "Jd, look we are going back to where you ate lunch" and she very quickly walked straight to the room and sat down. She was then able to sit and work then complete her homework schedule. She walked to the classroom packed up and went outside with a positive affect. Kailyn spoke to mom and came back into the school. I saw people at the door and heard Jd yelling. I ran down and went outside. Kailyn notated that she was walking to the front of the building and saw her starting to engage in behavior so she went back. Upon my arrival there were 4 staff there and mom monitoring the street due to  time and her safety. Kailyn was blocking her head as she was laying on the cement. Kalina, classroom SLP, ran inside to get a bean bag to put under her head, again for safety purposes. We directed the other staff to stand out of sight but to continue to monitor the road and people walking by. She eventually stood up and with many prompts walked to the car which was parked in the back of the building. The whole process took about 45 minutes. The scripting behavior I am referring to throughout the summary is a newer behavior. It is a specific script where she stops, puts her hands to her mouth likes she is yelling and produces an unintelligible script, lifts her hands above her head as if she is yelling then on her hips like she is angry. She then spins around and looks at the floor. Sometimes she will complete this one time and continue walking/ working. Other times she has to complete this 6 or 7 times in a row before getting started.  *School note (2023): -recently demonstrating significant increase in vocalizations/involuntary movements leading to a loss of fx in school -for past 2 months these have been debilitating for Jostormylyn -do not appear behavioral or sensory in nature - sensory/behavioral plans have not been successful -behaviors occurring in all settings/all teachers, preferred and non-preferred activities, resting times = do not appear behavioral or self-stim in nature -she tries to work through these involuntary mvments to complete her work or participate in activities she enjoys and she cannot - gets frustrated if times runs out b/c wanted to finish preferred task but body isn't letting her -quick head tilts, shoulder shrugs, freezing during a mvmt, shuffling gait, screaming, vocalizations, kicking, muscle twitches - data shows occurring every 3-20 seconds -diff with FM tasks, diff eating/opening packages, cant participate in gym activities she could in the past, diff participate in new learning activities -issues since ~January  *22 Behavioral Consultant provided info Overall, there has been a decrease in the upset/"emotional" episodes that typically included aggression, property destruction and screaming. The week before she was sick there was an increase in screaming and aggression. There is the possibility that it may have been due to not feeling well ahead of testing positive. She continues to engage in high rates of self-stimulatory behaviors and non-contextual vocalizations that interrupt her ability to engage in academic tasks. These behaviors occur throughout the day and occur during leisure and preferred activities as well. They are not restricted to times of academic tasks or non-preferred demands. We continue to see non-compliance during times of academic tasks and demands. These can present as task avoidance, but also includes times when we have difficulty refocusing and redirecting her back to tasks. There are times that she appears as "stuck" within her self-stimulatory and scripting behaviors and does not appear aware of her surroundings or directions being given. When she presents as more "stuck" her motor movements appear to be brief and jerky (i.e. a quick extension and retraction of her arm out in front of her) and her vocalizations are generally more sound or grunt based versus intelligible words or tv scripts. There have also been instances where she stares off to the side. Her eyes move to the side and remain fixed and she does not blink. Additionally, she does not respond to or track visual stimuli brought near her such as a waving hand. please let up know if there's any additional information or anything we can provide more examples of or elaborate on  In past: Goes into 12:1 for a morning meeting, and mainstreamed for some activities Does well with structured routine. Fairly independent in some settings. Mainstreamed largely for socialization opportunities. Some mainstreaming with an aide to help with games, sharing and conversation - -may need prompts to respond or initiate a greeting.  : School Name: [employer], 2 years Grade:  Services: -1st year in  - 10 students -2nd year in  - 8:1  *Behavioral Consult Progress report [facility] ([id number], 2019): -improving ability to tolerate change in routine, redirect, and interrupt preferred play -working on working quietly   *[facility] Hearing clinic (treatment plan 6:6-6:10yo yo, 2019): -working on functional communication, play skills, Pragmatic skills, answering 'wh' questions  *Private Services: -speech therapy at [facility] intermittently -Music therapy -Havent pursued private CHASITY services  *Teacher intake form (2021, 3rd grade): -Self-contained, OT, SLT -requires token economy to keep motivated and on task -seatwork: needs teacher prompt, w/o support work is illegible or incomplete -Academics: failing -Reading: = K level, Math = K level -Social: doesn't seek peers/adult relationships, will sit and parallel play but requires a lot of teacher support to interact appropriately  *Teacher intake form (3/2020): -self-contained, 7 students, multiple other staff, K-2 class, in 2nd grade -SLT -OT -Beh intervention services -highly benefits from CHASITY / discrete trials -thrives with routines, follows most directions -difficulty with changes in routine, generalizing skills, constant refocusing prompts -token board for motivation and on task behavior -Social: peers from Gen ed class seek her out, her interactions with these children are adult led, most of the time she requires prompting to respond back/engage appropriately -recently developed a sense of humor -Academics: below average - 2 or more yrs below grade level -Reading: not retaining learned words or generalizing words across difft materials -Math: K level -Writing: cant spell without visuals, needs some guides to stay on the line -Seatwork: inconsistent - at times needs significant prompting - other days - still requires prompting but not as much   PREVIOUS ASSESSMENTS/SERVICES:  Early Intervention: no services  Private speech therapy around 2 years old.  Earlier evaluations: reportedly wasn't classified as having autism  CURRENT FUNCTIONING/HISTORY OF PRESENTING CONCERNS:  *********AS noted during her initial consultation****   Concerns noted on our intake paperwork include: -poor speech/communication and social skills -makes limited eye contact -bright, comprehends more than she says verbally -difficulty interacting with others- jonathan. verbally - likes to be around other children but mostly parallel plays or needs prompting to interact verbally -she understands much more receptively -she can give appropriate responses with visual and verbal cues -lacks patience and can become frustrated when things don't go her way -responds best to routines/structure  Today's concerns: -autism concerns -ADHD concerns  Concerns: -limited eye contact -difficulty socializing -repetitive movements -perseverations -some compulsive behaviors -everything has a place - need to move something back if not in its own place-table needs to be placed in an exact manner -puts toys exactly / same position and location as she found it   Language/Communication: -Shared enjoyment/joint attention: likely decreased -turns to others for help -Response to name: better now than in the past, reduced in the past -Eye contact: good with requests from her, other times reduced or needs prompts -able to follow multiple step instructions -able to speak in sentences - receptive seems stronger than her expressive language, sometimes speaks in clear sentences, other times less well developed -often scripting  Behavioral: -ADHD: a number of concerns, impulsive, highly distractible, may need multiple prompts -frustrated easily at times -improving with sharing and transitions -still issues with calming when upset, fewer meltdowns than in the past  Emotional: -anxiousness/resistance at doctor's office.  Social/Play Skills: -scripts frequently when playing -play somewhat appropriate - demonstrates pretend play with play dolls- has 2 dolls in her arms or near her often when she is home (not in school) -some appropriate play with toys at home -demonstrates some pretend play with kitchen set -difficulty taking turns - timer seems to help -some social interest in peers - on her terms -very connected to her older sister - responds well to Soheila her older sister - somewhat of a jokester and can  on jokes quicker than her older sister  Atypical Behaviors/Sensory: -hand flapping: rarely -in past: spun herself around in circles -walks around in circles/laps around the house -may watch same scene of a video repetitively - may want parent to repeat something she says in regards to the video - may repeat saying it until validated -Focus on wheels: no -no toe walking -stands close to TV -difficulties with changes in routine and transition issues -some visual peering/unusual visual regard - usually with TV -generally no visual inspection -play can often be in a set pattern - can get upset if someone wants to play in a different manner -may chew banana and then spit it out into garbage due to texture issues -tactile: sensory seeking - when watching TV may touch the screen, no major issues with clothing, likes deep hugs when upset, often makes use of sensory room in school -may repetitively watch the same show in the same way -often does 'laps' around the house - jonathan. when anticipating something -Loud noise: generally no issue  Motor Abilities: -fair  ***********   *MEDICAL HISTORY:  Current Medications: -Prozac 40mg - increased in Oct -Guanfacine ER 3mg at night - around 6:30pm - no impact on sleep -Guanfacine 1mg early evening -MPH 5mg in AM -ON HOLD - FXR 5mg: only for a few days and a lot of other changes correlating. No major benefit. No major SE - 10/2024 - haven't retrialed -Claritin prn  Medication History: - swallows  *GIOVANNY 10 mg (2020-~2021): -10mg - unclear duration / benefit/SE -~2021 - trialed 20mg - unclear effects/SE - correlated with change in her usual routine. Seemed that worsened sleep, perhaps made her less interested in food, perhaps more scripting/rituals and more common meltdowns jonathan in afternoon around time medication thought to perhaps be wearing off --- although behavioral issues also noted on days that wasn't taking the medication so unclear if she truly failed GIOVANNY/stimulants - may trial again in future. Perhaps was quieter at times in school w/ medication  *Guanfacine 1mg (21 -2022, ~2022 restarted and later STOPPED 2022, 2024- ): -2021: 1mg BID - clear benefit noted, calmer, more cooperative, more engaged -2021: to trial small increases -2022: stopped, and shortly after increased Lexapro to 7.5mg - more active, impulsive, eating more, some more diff falling asleep -2022: restarted and later stopped -~2022-2023 - trial 1mg before bed as sleep aide, didn't help - stopped -2024: to retry as sleep aide -2024: helped with sleep 1mg at night -but parent concerned seem to correlate w/ new stim/tic like behavior and stopped, might retry -2024: benefit with sleep initiation w/ 1mg  *Zoloft 25 mg tab (2021 -10/2021 stopped): -seemed somewhat zoned out, at times somewhat sleepy in class/drift off, napped when usually didn't nap -once stopped Zoloft - more scripting, more circling around, more repeating, repetitive, scripts the same story, moving hands in set way, gets emotional at times and yells out - tab  *Concerta 18mg (~-10/2021 -for a few days and stopped, trialed 3 days - can consider trialing again at some point): -more meltdowns, on and off throughout the day, emotionally reactive/less easily redirectible -aggressive, yelling, agitated, hit, more physical -Duration: lasted most of the day, less than 12 hours  *Vyvanse 10mg (2021 -2022): -more outbursts, throughout day but mostly as wearing off -lasted a few hours, somewhat calmer -more scripting, more distracted by internal stimuli -Sleep: no impact -Diet: slight impact - less snacking  *Lexapro (2022-2022): -to trial to address outbursts, scripting, and behavioral challenges -2022: trialing 7.5mg - some decrease in internal scripting noted, seems happy -3/2022: increased to 10mg -2022: trial increase to 15mg -2022:-weaned off, as weaned off more impulsive behavior, more scripting, increased appetite, more rigid. Parents stopped as had continued with emotional outbursts - meltdowns in school, internal and external scripting. Not helpful enough and continued outbursts.   *AXR 5mg(3/2022-~2022): - unclear impact, some diff with sleep but manageable, perhaps fewer outbursts -3/31/22: trialed increase to 10mg - 'seemed off' and returned to 5mg -2022: trialed 10mg - more outbursts and scripting - stopped.   *Guanfacine ER (2022-): -2022: taking 2mg with noted benefit __2023: 3mg nightly __2024: tried going down to 2mg but worse sleep, so returned to 3mg __10/2024: recently increase to 4mg - no increase in benefit or SE __2024: returned to 3mg as 4mg wasn't any better  *Prozac 10mg (~2022-): -benefit noted -more engaged, less scripting, calmer -2023: trialed to 15mg - but noted tics and returned to 10mg. Benefit noted (discussed tics likely unrelated) -2023: parent increased to 15mg again -2023: 30mg daily -2024: reduced to 15mg - at 15mg beneficial, at 10mg insufficient benefit -2024: 15mg __10/2024: recent increase to 20mg __2024: increased to 30mg w/ some behavioral improvement   *MPH 2.5mg (~2024-): -seems to be helping her stay awake in school  *FXR 5 (2024-stopped after few days): -tried for about one wk -no major difference -perhaps scripting more   Allergies: -none  UPDATED PAST MEDICAL HISTORY: There have been no recent major medical changes.  Birth History: -born FT, 6.13lbs -regular delivery  ROS: A 10-point review of systems was performed. No concerns regarding vision and hearing. No cardiovascular, respiratory, gastrointestinal, renal, endocrine, neurologic, musculoskeletal, or dermatologic concerns.  *[facility] evaluation Dr. Wilver William - Chief developmental pediatrics: worked on ways to address her behavior: reportedly impression was that Naila didn't have autism.  Summer 2020: had episode of passing out - hospitalized and evaluated by Cardiology and Neurology. Neurology considering/ordered brain MRI. No seizure activity found  *20: -Reviewed inpatient Cardiac Consultation note (hospitalized following episode of syncope) *Cardiology (2020, Vee Huff and Elida Golden): -no cardiac contraindications for starting ADHD medication  Neurology (): -EEG performed -Tic like behaviors have improved/lessoned -MRI normal  Audiology (2-3 yo, Dr. sOborn): hearing thought to be normal, MT placed  Vision: none  Hospitalizations/ Surgeries: -MT placed   FAMILY HISTORY: Her father has a MA and is an . He received Speech services when younger. Generally healthy. Her mother has a MA and is a . She received Speech services when younger. Has type II diabetes - perhaps Type 1. Thyroid issues. She has a sister (Soheila, ). Soheila is also followed in our center  There is an extended family history of: -ADHD, learning problems, speech delay, borderline range of intellectual functioning: sister -Bipolar: Paternal uncles, PGM -Schizoaffective: Paternal uncle, lives in a special group home/residential program, also "simple-minded" - he reportedly trialed Prozac and it made him psychotic - requiring hospitalization - improved once stopped -Some easy distraction: father -type II diabetes: maternal side of family -heart murmur: mother, father, and other family members -Fatal Cardiac arrest: uncle, was living in adult independent residence, 50 yo  There is no history of heart rhythm problems or of sudden death.  SOCIAL HISTORY: -lives with parents and sister  PE (23 in person): -well appearing -S1S2 RRR -CTA b/l -FROM of extremities -no consistent muscle rigidity, no clear cogwheel rigidity -no clonus appreciated -fair tone and strength -EOM appeared intact __10/2024: -S1S2 RRR  Observations:  (Tele 21): -calm during visit, frequent verbalizations to herself, holding her hand up with perhaps visual inspection, stopped and greeted me with prompt, pacing around the home, cooperative (Tele 3/31/22): -calmer and more cooperative appearing - mother called her over and she complied, and when prompting said hello with associated EC (in person 2023): -frequent almost barking like vocalizations, and other noises while in waiting room - less frequent but still occurring intermittently during 70 min time in room -neck twitching, shoulder to ear, leg movements, face grimacing - also myoclonic in appearance - also occurred more freq in waiting and less common when involved w/ activity in office -intermittent shuffling gait/pause when walking and then continue walking more fluidly -when walking from waiting room, in office able to walk back and forth across room when requested fluidly -mostly calm during visit and involved in playing with a bin of toys - involuntary movements and vocalizations significantly decreased for a good portion of the visit - for around ~40 minutes few if any were noted, but as visit progressed were some episodes of these movements and vocalizations - usually lasting less than 30 seconds -crayons fell on floor - she wanted to pick them up and put back on table -one toy that was missed when had put away toy bin - despite redirection she went around doctor to put back in bin - seemed driven to do so -answered some questions - yes/no when given options of what to play with -cooperative with vital signs and exam ( 2024): -social rapport not established -intermittently gently touching eye with fingers and vocalizing, intermittent repetitively saying no -self-directed   * LABORATORY/TEST RESULTS/DEVELOPMENTAL ASSESSMENTS:  ***Testing as recorded in her IEP: (2018): __Goldman Fristoe Test of Articulation 3: -Sounds in Words: 69 __Expressive One Word Picture Vocabulary Test-4 (EOWPVT-4) (SS): 14 % __Receptive One Word Picture Vocabulary Test-4 (ROWPVT-4) (SS): 90 __Preschool Language Scale-5 (SS)(SD of 15): -Auditory Comp: 67 -Expressive Comm: 64 -Total Language: 63 __ABAS (Adaptive Behavior Assessment System-III) - Teacher: -General Adaptive Composite: 1 % -Gayle Chuy Tests of Achievement IV (SS) (SD = 15): -Many scores noted as "Not scorable" -Letter Word Identification: 84 -Passage Comprehension: 45 -Reading : 65 -Broad Readin -Basic Reading Skills: 82  -Applied Problems: 51  -Spellin -Broad Written Language: 58 -Basic Writing skills: 84 -Written Expression: 65  Cognitive assessment: reportedly attempted in the past but unable to complete testing secondary to behavior.  *Psychology Eval (2.10 yr): -difficult to engage -very short attention span -looked at herself in mirror for 3 minutes -difficulty cooperating with requests -inconsistent eye contact -often ignored those around her and needed support to engage and attend -FSIQ could not be obtained, non-compliant   (3/2020) __Child and Adolescent Symptom Inventory-5(DAVID-5):  Informant: Parent -inattention:  -hyperactivity/impulsivity: 3/9 -oppositional and defiant behaviors: 0 -feels compelled to perform unusual habits: often -makes vocal sounds for no apparent reason: often -social deficits/restricted and repetitive interests/behaviors: a number endorsed No other significant symptoms of any emotional or behavioral disorder  Informant: Teacher -inattention:  -hyperactivity/impulsivity:  -oppositional and defiant behaviors:  -Very often: -feels compelled to perform unusual habits, rocks, makes vocal sounds for no apparent reason -social deficits/restricted and repetitive interests/behaviors: many endorsed No other significant symptoms of any emotional or behavioral disorder Comments: -overall pleasure to have in class, happy girl -has been presenting with more anxiety compared to last year -repetitive movements - rocking, vocalizations, scripting - and rigidity - often impede her ability to learning and retain info, as well as generalize skills -thrives on routine/structure - if routine changed - can have tantrum and be difficult to redirect - screaming, crying, some staff aggression  [place] Assessment  -Informant: Caregiver on Intake Form -Inattention:  -Hyperactivity/Impulsivity:    (2021) Guanfacine 1mg BID [place] Assessment: -Informant: Teacher -Inattention:  -Hyperactivity/Impulsivity: 3/9 -Academics: problematic.

## 2025-05-01 NOTE — REASON FOR VISIT
[Home] : at home, [unfilled] , at the time of the visit. [Medical Office: ([facility])___] : at the medical office located in  [Telehealth (audio & video)] : This visit was provided via telehealth using real-time 2-way audio visual technology. [FreeTextEntry1] :   [phone] Mom cell  I had the pleasure of meeting with Naila Souza's mother, for a follow up appointment.  Introduction:  Initial Consultation Assessment (2020): Naila Souza presented for consultation for concerns for Autism and ADHD. This consultation was performed by phone given COVID-19.  *Autism concerns: The following is reported: -reduced eye contact, reduced response to name -speech delay -reduced shared enjoyment and joint attention -repetitively may watch part of a video -perserverates -some compulsive tendencies - everything has its place, upset if something isn't in its place -unusual visual regard -spun herself around when younger, 'laps' done around the home -holds 2 dolls with her much of the time she is at home -scripts -somewhat of a social interest but struggles with cooperative play -social pragmatic weaknesses -transition difficulties -self-directed  *ADHD: -impulsive -easily distracted -active  *Academics: -below grade level -reportedly past attempts at cognitive assessments were not successful  Overall Naila's presentation is consistent with the following:  *Autism Spectrum Disorder (requiring substantial support) (given that this consultation was not performed in person, a definitive diagnosis will be given during a follow up visit, but we discussed that by report her presentation is consistent with Autism)  *ADHD-Combined presentation. We discussed that in a self-directed individual, self-direction vs. inattention often can be difficult to decipher and that this requires continued monitoring, but a diagnosis is given at this time.  *Neurodevelopmental Disorder: this is reflective of her learning challenges and suspect cognitive challenges, in the absence of robust cognitive testing. Her learning profile and cognitive abilities require continued monitoring and evaluation overtime. Currently her cognitive abilities are unknown.  INTERIM HISTORY:  Home CHASITY: __2025: in place, Jd is receptive to her being present -to restart afterschool with CHASITY going with her  Summer 2025: -considering [employer] [facility] or [employer] (but not full day)  *Endo (2023): -mood changes with menses - can consider seeing a gynecologist to manage -normal A1C, do not have concerns regarding early Type 1 diabetes, antibodies for Type 1 DM negative -should not be at increased risk of DM type 1 on top of fmhx -f/u as needed  Menstrual periods have become more regular since 2024 (first period 2022)and this has been helpful with behavior.  Transitioned to [facility] fairly  Current Regimen: -Prozac 50mg -Guanfacine ER 3mg at night - around 6:30pm - no impact on sleep -Guanfacine 1mg prior to bed -MPH 5mg -FXR 5mg: only for a few days and a lot of other changes correlating. No major benefit. No major SE - 10/2024 - haven't retrialed -Melatonin 5mg  *MPH 2.5mg: __2024: -take in morning -seems to keep her awake more in school -and seems calmer in general __10/2024: -perhaps tried up to 5mg, didn't try long enough to get a sense of response __(started around beginning 2024, later stopped, restarted when began struggling in camp)- stopped summer 2024. __2025 - Taking 5mg in the morning - seems to help her get going. __2025: 5mg in morning seems to help getting her going in the morning/motivated in the monring  *Guanfacine 1mg: __2024: -had been trialing to help with sleep, seemed to help but noted increase in tic like behavior so stopped it -tic like beh was squinting her eyes, hand over eyes and shaking fingers, repetitive exp noises - at times can interrupt it, other times got stuck on it and didn't want to be disturbed -seems to have decreased in frequency overtime __10/2024: -havent retried prior to bed __2024: -1mg prior to bed seems helpful getting her to go to sleep earlier and waking somewhat earlier __2025: -Guanfacine 1mg prior to bed  *Fluoxetine 10mg: -benefit noted -less scripting, less defiance, speech is clearer, more engaged when speech with her, quieter, calmer, more accepting of being redirected -more focused and engaged with activities __2023: -trialed 15mg but then returned to 10mg as noted tic like behavior and worried might be related __2023: -retrying 15mg - no major difference __2023: -unclear level of benefit, likely somewhat beneficial -correlated with recent increase in constant motion - in past has also had similar behavior but more recently more prominent - unclear if SE of the medication __2024: -tried weaning off down from 35, got down to 10mg but more emotional dysregulation, now on 15mg and seems to be fair. On 35mg more 'in a fog' -benefit with 15mg of Prozac __2024: -15mg _: -In Sept increased to 20mg __2024: -increase to 30mg with noted improvement - perhaps increase in facial tics but improved after a couple of wks __2025: -taking 40mg. - less agitated - still may get upset but not as pronounced __2025: -50mg- benefit noted on increase  *Guanfacine ER 1mg: -continued outbursts and behavioral issues -Few days ago (as of 22) increased to 2mg - calmer, happier, more engaged - doing better at camp -still rigid -towards afternoon more impulsive, more impulsive eating -last night (22) diff falling asleep/woke up during the night __2022: -taking Guanfacine ER 2mg at night -less defiance, less outbursts __2023: -continuing 2mg nightly __2023: -after last visit increased to 3mg - unclear if any major difference __2024: -Guanfacine ER 3mg - tried going down to 2mg but worse sleep, so returned to 3mg __2024: -3mg __10/2024: -recently increased to 4mg - no major SE and no increased benefit __2024: -returned to 3mg as no increased benefit on 4 __2025: -Guanfacine ER 3mg at night - around 6:30pm - no impact on sleep   *Involuntary movements and vocalizations: __Neck twitch: a few beats of ear to shoulder - can happen when watching video, more common if scripting -more common since sick mid 2022 -a few times a day -doesn't seem to bother her although school thinks perhaps uncomfortable for her -no major pattern -wax and wane - school noticing more __2023: -shuffling gait -happening throughout the day, a few minutes at a time, variable - jerky movements, intermittent vocalizations and shuffling gait - more common if not busy, more common when sitting around or watching video -behaviors impact her from doing behaviors she wants to participate in or even eating -taking longer to eat due to the jerking/vocalizations -behaviors don't seem to bother her clearly although parents question if the grimacing is an expression of discomfort __2023: -significant decrease in these behaviors - evaluated by Neuro __2024: -involuntary movements have significantly decreased -shuffling gait has stopped -walks more fluently but covers her eyes __2024: -tic like beh was squinting her eyes, hand over eyes and shaking fingers, repetitive exp noises - at times can interrupt it, other times got stuck on it and didn't want to be disturbed -seems to have decreased in frequency overtime __2025: -when walking head down and hand almost covering her eyes - more likely busy/loud times, in school walks down the carl in ~saluting type of position with face covered/head down -generally no known walking/pausing  Communication: __2024: -may verbally protest -often speech not very clear -Speech very clear when Ipad not working and asking for assistance  *Rigidity/rituals/atypical behaviors/RRB: -variable, inconsistent -very rigid how she does things and how she places things -watches video clips repetitively -arranges dolls in particular fashion -set rituals __2024: -cycles of stereotyped behaviors -vocalizing and gently putting finger tips to right eye -vocalizing no no no no -periodic episodes of internal scripting __2025: Diff w/ transitions/ritualistic/rigid - house to car, nighttime routine of showers, daily routine needs - taking longer than the past - seems to be more of an issue at home than in school. Challenge in school as well. Good reports from school overall. Once out of bed quickly gets ready - coat on/hat on/on couch - looking at ipad - if encourage her to go - slows her further/needs to complete what doing on Ipad further. Showering in morning instead of night as similar issue at night with showering. More pronounced issue last few weeks. __2025: -some improvement with increase in Prozac to 50mg - transitioning more easily, transitions from screens more easily, less rigid -some increase in rocking with higher emotions   *Behavior: -zoning out more -more episodes of aggression -more scripting -less engaged -noted at home and in school -more defiance -harder to focus and redirect -Teachers reportedly note (2021): easily distracted -internal/external, scripting throughout the day, many ritualistic behaviors, if changes in routine -defiant, aggressive, property destruction, visual cues seems to help redirect her, throughout the school day, needs token economy system, behaviors interfering her progress, working on group skills. When focused makes more progress. Academically behind. Messy handwriting. Needs a lot of repetition -Home: does laps around the house, was cooperative during recent physical, scripting, directable at times, at times gets more emotional - gets frustrated at times but short lived. Talks under her breath/in a whisper. Slow w/ morning routine, but tries following routine. __2022: -worse behavior with Vyvanse __3/ School: -still diff focusing -fewer outbursts but still present -body jerking, vocal grunting - hard to get through -jerky movements - parent hasn't generally seen -giving more rewards more frequently -more snacks for sensory, more movement breaks __2022: -some improvement over recent past __2022: -certain sequencing she needs to complete - walk, stop, walk back, go forward again -internal and external scripting - in her own world, may not always respond -during scripting may get louder, more emotional, hard to calm down -Camp: had been in an inclusion group prior years with aide for SPED campers -but this year more problematic, needs more supports-- --now in a self-contained group __2022: -some improvement w/ Intuniv 2mg __2022: -some improvement w/ Intuniv and Prozac although some continued physical resistance in school and at times at home __2023: -continued scripting -may physically indicate 'no' 'go away' -if frustrated scripting becomes louder -overall behavior has been fair/manageable __2023: -increased impulsivity - got flour everywhere in kitchen tried doing her old laundry - at times impulsivity related to her wanting to be independent -scripting: becoming more internal -calm moments, more energetic moments __2023: -continued significant challenges with 'constant motion' (waxes and wanes, was present prior to SSRI trials as well, but has been worsening), internal and external scripting interrupting her fxing and related inattention, behavioral outbursts, aggressive behaviors (more common when around her period) -Constant motion - pacing, circles room -Gets stuck scripting/yelling/stimming - with time usually calms after 20-30 minutes - AT home more manageable. At camp/school more challenging to give her time/space. -program in her head she needs to go through before can move on - like in a trance Camp: -some screaming episodes 20-30 minutes, at times aggressive - may push/swat others __2024: -overall behavior has been better than the past, although persist being very challenging *School: -no recent aggressive episodes -some ritualistic behaviors -sleep is largest issues __2024: -participating more in class -no recent aggressive behaviors -less sleeping in class - sometimes seems to be task avoidant at times __10/2024: -Becoming somewhat more aggressive, may swat peers or staff -Respite program: needs time to deescalate, may need to remove from peers when gets upset -Ipad: -stuck on having it with her all the time -in school has to work for the Ipad - perseverates (during visit if took time loading increasing became upset, yelling -'its NOT working!!!!' and repeatedly tapping it, and hard to console) __2024: -Home: diff getting Ipad away from her. Transitioning better away from Ipad. If Ipad not working well still looking for parent phone. Still some frustration, but better, settles after a few minutes. -School: Improvement in beh in school . No more swatting staff. Still motivation issues. Still occasional outbursts with frustration -no more aggressive behaviors in general -Slow going in general, poor motivation, slow going in general __2025: -some improvement -occasional outbursts - swat/push impulse - but more easily redirected and settles more quickly -some improvement in behavior in school -more easily getting her to school earlier  __2024: -[facility] Respite program - saying now may no longer be able to stay there, not enough support there. Parents looking now in Home [facility] to see if able to get and perhaps can go w/ her to [facility] program Tried   *Sleep: -since Dec worsening/less consistent sleep -diff settling down for sleep - wouldn't fall asleep until 1 am -some nights asleep ~9pm -Wakes: if goes to sleep later diff to wake in the morning wants to sleep until 11am, in general if goes to sleep regular time wakes around 6 -2 instances of bedwetting recently - although may have been attention seeking - was awake trying to fall asleep -Melatonin 5mg - unclear if it helps -Most nights 1-2 nights up a little later than in general __2023: -more diff going to sleep, wants to stay up and play __2023: -prior to sleep issues ?related to period perhaps - some nights asleep around 9 and some nights around 1am - worsened during period __2023: Sleep improved as more active over summer - off and on as well __2024: -bedtime routine around 9:30/10pm in bed - routine takes time as she wants to do things in her own time/way - process takes a long time. Transitions between things are very time consuming -Majority of wk not asleep until Midnight to 1-2:30AM. Most mornings have to wake her around 7am, other times she wakes on her own earlier and then takes a nap for 3 hours in school. On wknds may sleep until 10-11am. - Due to slow going in morning often parent needs to bring her to school and she gets to school late -Stopped melatonin - no major differences __2024: -likes sleeping late - sometimes impacts her getting to school on time -sleep times vary __10/2024: -hard to get up in the morning -falling asleep around MN most night, sometimes 3am, waking 9-10 am and getting to school late, hard to get up in the bed __2024: -addition of short acting guanfacine helps her fall asleep -Morning: still slow going in the morning, so getting to school late - better b/c going to sleep earlier. Not napping in school -seems tired end of school day __2024: -awake much of the night w/ the Ipad, goes to sleep late, may nap in school, Getting to school ~11am. -more disrupted sleep since ankle injury -has tried melatonin in the past - seemed helpful __2025: -has been going to sleep earlier - improved sleep - but still diff getting her out of bed in the morning - may not get to school until midday __2025: -getting up more easily and more quickly completing morning routine   *Social: -enjoys being around her peers -takes more to engage her than in the past  *Anxiousness: -when on Zoloft somewhat calmer -a little more engaged -seems more overwhelmed at times, talking in a low tone, reduced EC -seems to be more of an issue recently __2022: -some improvement with Prozac  *Staring off: (2022) In School: There have also been instances where she stares off to the side. Her eyes move to the side and remain fixed and she does not blink. Additionally, she does not respond to or track visual stimuli brought near her such as a waving hand. Home: -havent noticed recently __2022: none recent  Private services: -began working with  retired [employer] program - has been helpful -music therapy - stopped -Private SLT - still attends -Respite services -Home [facility] - 2025 - organizing, awaiting therapist to be assigned. Will try to transition back to after school program with CHASITY.   *School input (24): We've noticed an increase in "no please!" which she uses to cover a lot of "annoying to her topics" - it's her way of saying "be quiet!", "too close", "I don't like that!" etc. She has been more willing to be redirected to a more specific statement that targets the external activity she doesn't like.  We've seen a great decrease in aggression - 1 swat on  and also she threw something on the floor. We have not seen anything else in terms of aggression in months!!!!!!!! Such great progress!  We notice a bit more of ritualistic behaviors the week before she gets her period. More singing, more "dancing in her seat" kind of thing, but nowhere near the amount she was doing in the beginning of the year. She transitions beautifully in the hallway and to classes. Biggest issue we have is the sleeping, but other than that she is an absolute delight! ***  SCHOOL HISTORY/DEVELOPMENTAL SERVICES:  Academics: -below grade level, closer to K level, starting to read, 12-15 sight words, can do basic addition -hard to engage to sit and work with her  *Report Card ([id number], Grade 1): -mostly 1s and 2s  School: [employer] Grade: 7th Services: -8:1:3 -SLT -OT -Social group -BCBA - parent training intermittently - helpful   Past school: School Name: [employer] Grade: 5th Services: *IEP -Classification: ASD -8:1:3 -SLT: individual and small group -OT -Social group -perhaps some mainstreaming -Chorus - likely w/ an aide -Behavior intervention services - BCBA involved in her programming - monitoring and tracking her behavior in class-(CHASITY trained therapist/teacher in place/[facility]) -Parent training/counseling -ESY (although attended a summer camp program which she did well in ) -NO testing accommodations Comments: -demonstrates global developmental delays impacting academic progress -thrives on classroom routine -does well with rote questions -requires focusing prompts to answer questions in group -working with her on what quiet means and will incorporate times that acceptable to script and times were it isn't -responding to social greetings and farewells are emerging with visual support and models -establishing eye contact is an ongoing goal -needs consistent modeling and prompting to interact with her peers during social times  __ 23: School report: Naila arrived at school today calm. Around 8:50 we began transitioning down the hallway to go to the bus for our middle school visit. She began to engage in negative vocalizations that quickly turned into screams. She then laid on the bench in the front carl and continued to scream and aggress toward anyone that came in reach of her. We were able to get her calm enough to comply with standing and walking. We began slowly walking down the carl with many stops along the way (I will describe this behavior below). She continued to scream during the walk but was not aggressing. Once we got to the turn in the hallway, the principal joined us. She reminded Jd that she is okay and we are all here for her. Naila began to escalate in her screams and begin to aggress. Most of the aggression at this point was towards the principal but would switch to any person who tried to block or redirect her. The aggression included swats, hits, pushes and kicks. She was so escalated we needed to help her walk into the OT room to deescalate. Once there, she laid on the floor and we got her a pillow and a blanket. She was crying so hard she was no longer making sounds. We shut off the lights and positioned ourselves close enough to her, so she knew we were there to support her but far enough away to give her the space she needed to come back down. Once semi quiet, she earned the iPad and watched it for about 6 minutes. The timer then went off and she earned tokens for completing instructions. She was able to sit up, stand up, sit in a chair and walk to class where she earned the iPad again. From this time until approximately noon (about 2 hours), Naila was on edge with some screams and aggression but was able to complete some work. Unfortunately, around noon she began to escalate again. This episode took place in the classroom and she sat on top of her desk which was unsafe. We directed her into the work room in the back of the classroom where it was quiet and also where she would not disturb her classmates. At this point the magnitude of the aggression was very high. Kailyn and SHAHZAD were in the room with her with her token board and reinforcement. We tried a variety of methods, reinforcing moments of quiet, telling her she is safe and we are here to support her, ignoring behaviors, blocking and redirecting, as well as, showing her the reinforcement and providing almost immediate access for any type of compliance. None of these methods were successful. She aggressed continuously for almost 50 minutes. For reference, I was able to take 1 minute of frequency data and there were 46 aggressions in one minute. Aggression included hits to our bodies and faces, kicks to our bodies and wrapping her arms around our necks and slamming her body into us. At one point, we stepped out of the room to give her space and she continued to hit the walls and screaming while looking at us. She also went through a period of hugging me in a consoling way then stepping back and hitting me in the face then returning to hugging. This sequence happened 6 or 7 times. Eventually, we were able to guide her out of the room and back to her table where she was able to earn reinforcement and transition to lunch. The transition took about 26 minutes, and she did not make it to the lunchroom. Instead, in order to ensure she got to eat, we stopped in a one of the teacher's quiet small office spaces which is 165 feet from [facility]. Once inside she was able to sit calmly and eat her lunch. After lunch, iPad was terminated, and she began to work and earn on her regular schedule. It was then time to transition to chorus, she made it to approximately the OT room (similar distance as the previous transition) in about 23 minutes. It was at that point that we realized we needed to turn back or she would not make it back to class in time for dismissal. We return walking with her continuously stopping and scripting. Once we were in view of that same quiet small office space the TA pointed and said "Jd, look we are going back to where you ate lunch" and she very quickly walked straight to the room and sat down. She was then able to sit and work then complete her homework schedule. She walked to the classroom packed up and went outside with a positive affect. Kailyn spoke to mom and came back into the school. I saw people at the door and heard Jd yelling. I ran down and went outside. Kailyn notated that she was walking to the front of the building and saw her starting to engage in behavior so she went back. Upon my arrival there were 4 staff there and mom monitoring the street due to  time and her safety. Kailyn was blocking her head as she was laying on the cement. Kalina, classroom SLP, ran inside to get a bean bag to put under her head, again for safety purposes. We directed the other staff to stand out of sight but to continue to monitor the road and people walking by. She eventually stood up and with many prompts walked to the car which was parked in the back of the building. The whole process took about 45 minutes. The scripting behavior I am referring to throughout the summary is a newer behavior. It is a specific script where she stops, puts her hands to her mouth likes she is yelling and produces an unintelligible script, lifts her hands above her head as if she is yelling then on her hips like she is angry. She then spins around and looks at the floor. Sometimes she will complete this one time and continue walking/ working. Other times she has to complete this 6 or 7 times in a row before getting started.  *School note (2023): -recently demonstrating significant increase in vocalizations/involuntary movements leading to a loss of fx in school -for past 2 months these have been debilitating for Naila -do not appear behavioral or sensory in nature - sensory/behavioral plans have not been successful -behaviors occurring in all settings/all teachers, preferred and non-preferred activities, resting times = do not appear behavioral or self-stim in nature -she tries to work through these involuntary mvments to complete her work or participate in activities she enjoys and she cannot - gets frustrated if times runs out b/c wanted to finish preferred task but body isn't letting her -quick head tilts, shoulder shrugs, freezing during a mvmt, shuffling gait, screaming, vocalizations, kicking, muscle twitches - data shows occurring every 3-20 seconds -diff with FM tasks, diff eating/opening packages, cant participate in gym activities she could in the past, diff participate in new learning activities -issues since ~January  *22 Behavioral Consultant provided info Overall, there has been a decrease in the upset/"emotional" episodes that typically included aggression, property destruction and screaming. The week before she was sick there was an increase in screaming and aggression. There is the possibility that it may have been due to not feeling well ahead of testing positive. She continues to engage in high rates of self-stimulatory behaviors and non-contextual vocalizations that interrupt her ability to engage in academic tasks. These behaviors occur throughout the day and occur during leisure and preferred activities as well. They are not restricted to times of academic tasks or non-preferred demands. We continue to see non-compliance during times of academic tasks and demands. These can present as task avoidance, but also includes times when we have difficulty refocusing and redirecting her back to tasks. There are times that she appears as "stuck" within her self-stimulatory and scripting behaviors and does not appear aware of her surroundings or directions being given. When she presents as more "stuck" her motor movements appear to be brief and jerky (i.e. a quick extension and retraction of her arm out in front of her) and her vocalizations are generally more sound or grunt based versus intelligible words or tv scripts. There have also been instances where she stares off to the side. Her eyes move to the side and remain fixed and she does not blink. Additionally, she does not respond to or track visual stimuli brought near her such as a waving hand. please let up know if there's any additional information or anything we can provide more examples of or elaborate on  In past: Goes into 12:1 for a morning meeting, and mainstreamed for some activities Does well with structured routine. Fairly independent in some settings. Mainstreamed largely for socialization opportunities. Some mainstreaming with an aide to help with games, sharing and conversation - -may need prompts to respond or initiate a greeting.  : School Name: [employer] [facility] [employer], 2 years Grade:  Services: -1st year in  - 10 students -2nd year in  - 8:1  *Behavioral Consult Progress report [facility] Autism (2027-3025, 2019): -improving ability to tolerate change in routine, redirect, and interrupt preferred play -working on working quietly   *[facility] Speech/Language Hearing clinic (treatment plan 6:6-6:10yo yo, 2019): -working on functional communication, play skills, Pragmatic skills, answering 'wh' questions  *Private Services: -speech therapy at [facility] intermittently -Music therapy -Havent pursued private CHASITY services  *Teacher intake form (2021, 3rd grade): -Self-contained, OT, SLT -requires token economy to keep motivated and on task -seatwork: needs teacher prompt, w/o support work is illegible or incomplete -Academics: failing -Reading: = K level, Math = K level -Social: doesn't seek peers/adult relationships, will sit and parallel play but requires a lot of teacher support to interact appropriately  *Teacher intake form (3/2020): -self-contained, 7 students, multiple other staff, K-2 class, in 2nd grade -SLT -OT -Beh intervention services -highly benefits from CHASITY / discrete trials -thrives with routines, follows most directions -difficulty with changes in routine, generalizing skills, constant refocusing prompts -token board for motivation and on task behavior -Social: peers from Gen ed class seek her out, her interactions with these children are adult led, most of the time she requires prompting to respond back/engage appropriately -recently developed a sense of humor -Academics: below average - 2 or more yrs below grade level -Reading: not retaining learned words or generalizing words across difft materials -Math: K level -Writing: cant spell without visuals, needs some guides to stay on the line -Seatwork: inconsistent - at times needs significant prompting - other days - still requires prompting but not as much   PREVIOUS ASSESSMENTS/SERVICES:  Early Intervention: no services  Private speech therapy around 2 years old.  Earlier evaluations: reportedly wasn't classified as having autism  CURRENT FUNCTIONING/HISTORY OF PRESENTING CONCERNS:  *********AS noted during her initial consultation****   Concerns noted on our intake paperwork include: -poor speech/communication and social skills -makes limited eye contact -bright, comprehends more than she says verbally -difficulty interacting with others- jonathan. verbally - likes to be around other children but mostly parallel plays or needs prompting to interact verbally -she understands much more receptively -she can give appropriate responses with visual and verbal cues -lacks patience and can become frustrated when things don't go her way -responds best to routines/structure  Today's concerns: -autism concerns -ADHD concerns  Concerns: -limited eye contact -difficulty socializing -repetitive movements -perseverations -some compulsive behaviors -everything has a place - need to move something back if not in its own place-table needs to be placed in an exact manner -puts toys exactly / same position and location as she found it   Language/Communication: -Shared enjoyment/joint attention: likely decreased -turns to others for help -Response to name: better now than in the past, reduced in the past -Eye contact: good with requests from her, other times reduced or needs prompts -able to follow multiple step instructions -able to speak in sentences - receptive seems stronger than her expressive language, sometimes speaks in clear sentences, other times less well developed -often scripting  Behavioral: -ADHD: a number of concerns, impulsive, highly distractible, may need multiple prompts -frustrated easily at times -improving with sharing and transitions -still issues with calming when upset, fewer meltdowns than in the past  Emotional: -anxiousness/resistance at doctor's office.  Social/Play Skills: -scripts frequently when playing -play somewhat appropriate - demonstrates pretend play with play dolls- has 2 dolls in her arms or near her often when she is home (not in school) -some appropriate play with toys at home -demonstrates some pretend play with kitchen set -difficulty taking turns - timer seems to help -some social interest in peers - on her terms -very connected to her older sister - responds well to Soheila her older sister - somewhat of a jokester and can  on jokes quicker than her older sister  Atypical Behaviors/Sensory: -hand flapping: rarely -in past: spun herself around in circles -walks around in circles/laps around the house -may watch same scene of a video repetitively - may want parent to repeat something she says in regards to the video - may repeat saying it until validated -Focus on wheels: no -no toe walking -stands close to TV -difficulties with changes in routine and transition issues -some visual peering/unusual visual regard - usually with TV -generally no visual inspection -play can often be in a set pattern - can get upset if someone wants to play in a different manner -may chew banana and then spit it out into garbage due to texture issues -tactile: sensory seeking - when watching TV may touch the screen, no major issues with clothing, likes deep hugs when upset, often makes use of sensory room in school -may repetitively watch the same show in the same way -often does 'laps' around the house - jonathan. when anticipating something -Loud noise: generally no issue  Motor Abilities: -fair  ***********   *MEDICAL HISTORY:  Current Medications: -Prozac 50mg - increased in Feb -Guanfacine ER 3mg at night - around 6:30pm - no impact on sleep -Guanfacine 1mg early evening -MPH 5mg in AM -ON HOLD - FXR 5mg: only for a few days and a lot of other changes correlating. No major benefit. No major SE - 10/2024 - haven't retrialed -Claritin prn  Medication History: - swallows  *GIOVANNY 10 mg (2020-~2021): -10mg - unclear duration / benefit/SE -~2021 - trialed 20mg - unclear effects/SE - correlated with change in her usual routine. Seemed that worsened sleep, perhaps made her less interested in food, perhaps more scripting/rituals and more common meltdowns jonathan in afternoon around time medication thought to perhaps be wearing off --- although behavioral issues also noted on days that wasn't taking the medication so unclear if she truly failed GIOVANNY/stimulants - may trial again in future. Perhaps was quieter at times in school w/ medication  *Guanfacine 1mg (21 -2022, ~2022 restarted and later STOPPED 2022, 2024- ): -2021: 1mg BID - clear benefit noted, calmer, more cooperative, more engaged -2021: to trial small increases -2022: stopped, and shortly after increased Lexapro to 7.5mg - more active, impulsive, eating more, some more diff falling asleep -2022: restarted and later stopped -~2022-2023 - trial 1mg before bed as sleep aide, didn't help - stopped -2024: to retry as sleep aide -2024: helped with sleep 1mg at night -but parent concerned seem to correlate w/ new stim/tic like behavior and stopped, might retry -2024: benefit with sleep initiation w/ 1mg  *Zoloft 25 mg tab (2021 -10/2021 stopped): -seemed somewhat zoned out, at times somewhat sleepy in class/drift off, napped when usually didn't nap -once stopped Zoloft - more scripting, more circling around, more repeating, repetitive, scripts the same story, moving hands in set way, gets emotional at times and yells out - tab  *Concerta 18mg (~-10/2021 -for a few days and stopped, trialed 3 days - can consider trialing again at some point): -more meltdowns, on and off throughout the day, emotionally reactive/less easily redirectible -aggressive, yelling, agitated, hit, more physical -Duration: lasted most of the day, less than 12 hours  *Vyvanse 10mg (2021 -2022): -more outbursts, throughout day but mostly as wearing off -lasted a few hours, somewhat calmer -more scripting, more distracted by internal stimuli -Sleep: no impact -Diet: slight impact - less snacking  *Lexapro (2022-2022): -to trial to address outbursts, scripting, and behavioral challenges -2022: trialing 7.5mg - some decrease in internal scripting noted, seems happy -3/2022: increased to 10mg -2022: trial increase to 15mg -2022:-weaned off, as weaned off more impulsive behavior, more scripting, increased appetite, more rigid. Parents stopped as had continued with emotional outbursts - meltdowns in school, internal and external scripting. Not helpful enough and continued outbursts.   *AXR 5mg(3/2022-~2022): - unclear impact, some diff with sleep but manageable, perhaps fewer outbursts -3/31/22: trialed increase to 10mg - 'seemed off' and returned to 5mg -2022: trialed 10mg - more outbursts and scripting - stopped.   *Guanfacine ER (2022-): -2022: taking 2mg with noted benefit __2023: 3mg nightly __2024: tried going down to 2mg but worse sleep, so returned to 3mg __10/2024: recently increase to 4mg - no increase in benefit or SE __2024: returned to 3mg as 4mg wasn't any better  *Prozac 10mg (~2022-): -benefit noted -more engaged, less scripting, calmer -2023: trialed to 15mg - but noted tics and returned to 10mg. Benefit noted (discussed tics likely unrelated) -2023: parent increased to 15mg again -2023: 30mg daily -2024: reduced to 15mg - at 15mg beneficial, at 10mg insufficient benefit -2024: 15mg __10/2024: recent increase to 20mg __2024: increased to 30mg w/ some behavioral improvement   *MPH 2.5mg (~2024-): -seems to be helping her stay awake in school  *FXR 5 (2024-stopped after few days): -tried for about one wk -no major difference -perhaps scripting more   Allergies: -none  UPDATED PAST MEDICAL HISTORY: There have been no recent major medical changes.  Birth History: -born FT, 6.13lbs -regular delivery  ROS: A 10-point review of systems was performed. No concerns regarding vision and hearing. No cardiovascular, respiratory, gastrointestinal, renal, endocrine, neurologic, musculoskeletal, or dermatologic concerns.  *[place] evaluation Dr. Wilver William - Chief developmental pediatrics: worked on ways to address her behavior: reportedly impression was that Naila didn't have autism.  Summer 2020: had episode of passing out - hospitalized and evaluated by Cardiology and Neurology. Neurology considering/ordered brain MRI. No seizure activity found  *20: -Reviewed inpatient Cardiac Consultation note (hospitalized following episode of syncope) *Cardiology (2020, Vee Huff and Elida Golden): -no cardiac contraindications for starting ADHD medication  Neurology (): -EEG performed -Tic like behaviors have improved/lessoned -MRI normal  Audiology (2-3 yo, Dr. Osborn): hearing thought to be normal, MT placed  Vision: none  Hospitalizations/ Surgeries: -MT placed   FAMILY HISTORY: Her father has a MA and is an . He received Speech services when younger. Generally healthy. Her mother has a MA and is a . She received Speech services when younger. Has type II diabetes - perhaps Type 1. Thyroid issues. She has a sister (Soheila, 2007). Soheila is also followed in our center  There is an extended family history of: -ADHD, learning problems, speech delay, borderline range of intellectual functioning: sister -Bipolar: Paternal uncles, PGM -Schizoaffective: Paternal uncle, lives in a special group home/residential program, also "simple-minded" - he reportedly trialed Prozac and it made him psychotic - requiring hospitalization - improved once stopped -Some easy distraction: father -type II diabetes: maternal side of family -heart murmur: mother, father, and other family members -Fatal Cardiac arrest: uncle, was living in adult independent residence, 48 yo  There is no history of heart rhythm problems or of sudden death.  SOCIAL HISTORY: -lives with parents and sister  PE (23 in person): -well appearing -S1S2 RRR -CTA b/l -FROM of extremities -no consistent muscle rigidity, no clear cogwheel rigidity -no clonus appreciated -fair tone and strength -EOM appeared intact __10/2024: -S1S2 RRR  Observations:  (Tele 21): -calm during visit, frequent verbalizations to herself, holding her hand up with perhaps visual inspection, stopped and greeted me with prompt, pacing around the home, cooperative (Tele 3/31/22): -calmer and more cooperative appearing - mother called her over and she complied, and when prompting said hello with associated EC (in person 2023): -frequent almost barking like vocalizations, and other noises while in waiting room - less frequent but still occurring intermittently during 70 min time in room -neck twitching, shoulder to ear, leg movements, face grimacing - also myoclonic in appearance - also occurred more freq in waiting and less common when involved w/ activity in office -intermittent shuffling gait/pause when walking and then continue walking more fluidly -when walking from waiting room, in office able to walk back and forth across room when requested fluidly -mostly calm during visit and involved in playing with a bin of toys - involuntary movements and vocalizations significantly decreased for a good portion of the visit - for around ~40 minutes few if any were noted, but as visit progressed were some episodes of these movements and vocalizations - usually lasting less than 30 seconds -crayons fell on floor - she wanted to pick them up and put back on table -one toy that was missed when had put away toy bin - despite redirection she went around doctor to put back in bin - seemed driven to do so -answered some questions - yes/no when given options of what to play with -cooperative with vital signs and exam ( 2024): -social rapport not established -intermittently gently touching eye with fingers and vocalizing, intermittent repetitively saying no -self-directed   * LABORATORY/TEST RESULTS/DEVELOPMENTAL ASSESSMENTS:  ***Testing as recorded in her IEP: (2018): __Goldman Fristoe Test of Articulation 3: -Sounds in Words: 69 __Expressive One Word Picture Vocabulary Test-4 (EOWPVT-4) (SS): 14 % __Receptive One Word Picture Vocabulary Test-4 (ROWPVT-4) (SS): 90 __Preschool Language Scale-5 (SS)(SD of 15): -Auditory Comp: 67 -Expressive Comm: 64 -Total Language: 63 __ABAS (Adaptive Behavior Assessment System-III) - Teacher: -General Adaptive Composite: 1 % -Gayle Chuy Tests of Achievement IV (SS) (SD = 15): -Many scores noted as "Not scorable" -Letter Word Identification: 84 -Passage Comprehension: 45 -Reading : 65 -Broad Readin -Basic Reading Skills: 82  -Applied Problems: 51  -Spellin -Broad Written Language: 58 -Basic Writing skills: 84 -Written Expression: 65  Cognitive assessment: reportedly attempted in the past but unable to complete testing secondary to behavior.  *Psychology Eval (2.10 yr): -difficult to engage -very short attention span -looked at herself in mirror for 3 minutes -difficulty cooperating with requests -inconsistent eye contact -often ignored those around her and needed support to engage and attend -FSIQ could not be obtained, non-compliant   (3/2020) __Child and Adolescent Symptom Inventory-5(DAVID-5):  Informant: Parent -inattention:  -hyperactivity/impulsivity: 3/9 -oppositional and defiant behaviors:  -feels compelled to perform unusual habits: often -makes vocal sounds for no apparent reason: often -social deficits/restricted and repetitive interests/behaviors: a number endorsed No other significant symptoms of any emotional or behavioral disorder  Informant: Teacher -inattention:  -hyperactivity/impulsivity:  -oppositional and defiant behaviors:  -Very often: -feels compelled to perform unusual habits, rocks, makes vocal sounds for no apparent reason -social deficits/restricted and repetitive interests/behaviors: many endorsed No other significant symptoms of any emotional or behavioral disorder Comments: -overall pleasure to have in class, happy girl -has been presenting with more anxiety compared to last year -repetitive movements - rocking, vocalizations, scripting - and rigidity - often impede her ability to learning and retain info, as well as generalize skills -thrives on routine/structure - if routine changed - can have tantrum and be difficult to redirect - screaming, crying, some staff aggression  [place] Assessment  -Informant: Caregiver on Intake Form -Inattention:  -Hyperactivity/Impulsivity:    (2021) Guanfacine 1mg BID [place] Assessment: -Informant: Teacher -Inattention:  -Hyperactivity/Impulsivity: 3/9 -Academics: problematic.

## 2025-05-01 NOTE — PLAN
[FreeTextEntry3] :  1. Follow up is scheduled in 2-3 months  2. Medication:  *CURRENT: __Intuniv: Current 3 mg -continue (no increased benefit on past trial of 4mg) __Fluoxetine 50mg: Continue. If fails to wean and consider trialing alternate such as Zoloft __Short acting Guanfacine: Continue 1mg prior for sleep initiation. Can shift closer to bedtime and if needed increase to up to 2mg __MPH 5mg in AM: Current 5mg. Can continue using in morning 5mg to help her get on task in the morning. Can trial higher doses and multiple dosing per day if desired. Targeting Motivation/slow going issue. Can consider using multiple times daily if good response or retrialing XR -considering retrial FXR. If do so can decrease the MPH from 5mg to 2.5 during trial, if struggling again in morning can do MPH 5mg in morning and the FXR __FXR 5mg: To retry given good response on MPH 5mg. Monitor scripting. If unavailable trial [place] ER if covered. Target: impulsive behaviors/ADHD sxs/behavioral issues - Parents haven't retried -ABILIFY - multiple times have discussed Abilify indicated and is an option. Has been evaluated by Endo already given fmhx of DM. Naila does have a hx of atypical movements which vary overtime while not on atypical antipsychotics, which may complicate monitoring for SE - although these movements have decreased significantly recently  *2/2025-Have discussed multiple times my recommendation to begin medication management with Child Psychiatry given complexity of her case and ongoing challenges. Offered direct referral to [facility] and provided info of other routes to pursue. Can also consider school related urgent beh health at [place]. Parents have opted to not to pursue as of yet.  3. OPWDD: submitting application  4. Home CHASITY: organizing  We discussed effects/SE of: - Stimulants (for her behavior and ADHD) -Alpha agonists (for behavior and ADHD) -SSRI's (for her anxiousness, rigidness, behavior, easy frustration, rituals, scripting). [email or web address] - ADHD  5. Sleep: __1/2024: -significant challenges - to trial short acting guanfacine as sleep aide -recc scheduled in Sleep clinic eval in case needed __11/2024: -discussed shifting bedtime as tolerated slowly earlier hopefully leading to waking earlier and getting to school earlier -if upcoming med changes aren't helpful Sleep medicine consult can be considered -noted FMLA may be appropriate __12/2024: -consider trialing up to 5mg melotonin -adjustments to short acting Guanfacine as above -diff at night separting from IPad to go to sleep, tends to watch videos on IPad and do other activities. Consider TV in background to replace Ipad and perhaps easier to transition away from Ipad and help her fall asleep sooner -rec sleep medicine consult - info provided  6. Home CHASITY: __11/2024: -if needed contact for brief visit for CARS -may be helpful, can work on sleep and behavior,  from Ipad Previous Recommendations: *Autism reccs *ADHD reccs *Social skills groups __12/2024: -dx letter provided per request as part of process looking into Home CHASITY. If needed to schedule time for CARS  It was a pleasure to work with Naila and her family today. Please do not hesitate to contact Dr. Vasques at [phone] with any other further questions or concerns.

## 2025-07-10 NOTE — REASON FOR VISIT
[Other Location: e.g. School (Enter Location, City,State)___] : at [unfilled], at the time of the visit. [Medical Office: ([facility])___] : at the medical office located in  [Telehealth (audio & video)] : This visit was provided via telehealth using real-time 2-way audio visual technology. [FreeTextEntry1] :   [phone] Mom cell  I had the pleasure of meeting with Naila Souza and her mother, for a follow up appointment.  Introduction:  Initial Consultation Assessment (2020): Naila Souza presented for consultation for concerns for Autism and ADHD. This consultation was performed by phone given COVID-19.  *Autism concerns: The following is reported: -reduced eye contact, reduced response to name -speech delay -reduced shared enjoyment and joint attention -repetitively may watch part of a video -perserverates -some compulsive tendencies - everything has its place, upset if something isn't in its place -unusual visual regard -spun herself around when younger, 'laps' done around the home -holds 2 dolls with her much of the time she is at home -scripts -somewhat of a social interest but struggles with cooperative play -social pragmatic weaknesses -transition difficulties -self-directed  *ADHD: -impulsive -easily distracted -active  *Academics: -below grade level -reportedly past attempts at cognitive assessments were not successful  Overall Naila's presentation is consistent with the following:  *Autism Spectrum Disorder (requiring substantial support) (given that this consultation was not performed in person, a definitive diagnosis will be given during a follow up visit, but we discussed that by report her presentation is consistent with Autism)  *ADHD-Combined presentation. We discussed that in a self-directed individual, self-direction vs. inattention often can be difficult to decipher and that this requires continued monitoring, but a diagnosis is given at this time.  *Neurodevelopmental Disorder: this is reflective of her learning challenges and suspect cognitive challenges, in the absence of robust cognitive testing. Her learning profile and cognitive abilities require continued monitoring and evaluation overtime. Currently her cognitive abilities are unknown.  INTERIM HISTORY:  Home RYAN: __2025: in place, Jd is receptive to her being present -to restart afterschool with RYAN going with her  Summer 2025: -doing [employer] and trying to set up [facility] camp program but need RYAN therapist to be there -considering [employer] or [employer] (but not full day)  *Endo (2023): -mood changes with menses - can consider seeing a gynecologist to manage -normal A1C, do not have concerns regarding early Type 1 diabetes, antibodies for Type 1 DM negative -should not be at increased risk of DM type 1 on top of fmhx -f/u as needed  Menstrual periods have become more regular since 2024 (first period 2022)and this has been helpful with behavior.  Transitioned to [facility] fairly  Current Regimen: -Prozac 50mg -Guanfacine ER 3mg at night - around 6:30pm - no impact on sleep -Guanfacine 1mg prior to bed -MPH 5mg, occasionally give second does in afternoon to keep her awake - on days she wants to take a nap -FXR 5mg: only for a few days and a lot of other changes correlating. No major benefit. No major SE - 2025 - haven't retrialed -Melatonin 5mg  *MPH 2.5mg: __2024: -take in morning -seems to keep her awake more in school -and seems calmer in general __10/2024: -perhaps tried up to 5mg, didn't try long enough to get a sense of response __(started around beginning 2024, later stopped, restarted when began struggling in camp)- stopped summer 2024. __2025 - Taking 5mg in the morning - seems to help her get going. __2025: 5mg in morning seems to help getting her going in the morning/motivated in the morning. Occasional 5mg in afternoon to avoid nap  *Guanfacine 1mg: __2024: -had been trialing to help with sleep, seemed to help but noted increase in tic like behavior so stopped it -tic like beh was squinting her eyes, hand over eyes and shaking fingers, repetitive exp noises - at times can interrupt it, other times got stuck on it and didn't want to be disturbed -seems to have decreased in frequency overtime __10/2024: -havent retried prior to bed __2024: -1mg prior to bed seems helpful getting her to go to sleep earlier and waking somewhat earlier __2025: -Guanfacine 1mg prior to bed  *Fluoxetine 10mg: -benefit noted -less scripting, less defiance, speech is clearer, more engaged when speech with her, quieter, calmer, more accepting of being redirected -more focused and engaged with activities __2023: -trialed 15mg but then returned to 10mg as noted tic like behavior and worried might be related _: -retrying 15mg - no major difference _: -unclear level of benefit, likely somewhat beneficial -correlated with recent increase in constant motion - in past has also had similar behavior but more recently more prominent - unclear if SE of the medication __2024: -tried weaning off down from 35, got down to 10mg but more emotional dysregulation, now on 15mg and seems to be fair. On 35mg more 'in a fog' -benefit with 15mg of Prozac __2024: -15mg _: -In Sept increased to 20mg __2024: -increase to 30mg with noted improvement - perhaps increase in facial tics but improved after a couple of wks __2025: -taking 40mg. - less agitated - still may get upset but not as pronounced __2025: -50mg- benefit noted on increase _: -taking 50mg  *Guanfacine ER 1mg: -continued outbursts and behavioral issues -Few days ago (as of 22) increased to 2mg - calmer, happier, more engaged - doing better at camp -still rigid -towards afternoon more impulsive, more impulsive eating -last night (22) diff falling asleep/woke up during the night __2022: -taking Guanfacine ER 2mg at night -less defiance, less outbursts __2023: -continuing 2mg nightly _: -after last visit increased to 3mg - unclear if any major difference __2024: -Guanfacine ER 3mg - tried going down to 2mg but worse sleep, so returned to 3mg _: -3mg _: -recently increased to 4mg - no major SE and no increased benefit __2024: -returned to 3mg as no increased benefit on 4 _: -Guanfacine ER 3mg at night - around 6:30pm - no impact on sleep   *Involuntary movements and vocalizations: __Neck twitch: a few beats of ear to shoulder - can happen when watching video, more common if scripting -more common since sick mid 2022 -a few times a day -doesn't seem to bother her although school thinks perhaps uncomfortable for her -no major pattern -wax and wane - school noticing more __2023: -shuffling gait -happening throughout the day, a few minutes at a time, variable - jerky movements, intermittent vocalizations and shuffling gait - more common if not busy, more common when sitting around or watching video -behaviors impact her from doing behaviors she wants to participate in or even eating -taking longer to eat due to the jerking/vocalizations -behaviors don't seem to bother her clearly although parents question if the grimacing is an expression of discomfort __2023: -significant decrease in these behaviors - evaluated by Neuro __2024: -involuntary movements have significantly decreased -shuffling gait has stopped -walks more fluently but covers her eyes __2024: -tic like beh was squinting her eyes, hand over eyes and shaking fingers, repetitive exp noises - at times can interrupt it, other times got stuck on it and didn't want to be disturbed -seems to have decreased in frequency overtime __2025: -when walking head down and hand almost covering her eyes - more likely busy/loud times, in school walks down the carl in ~saluting type of position with face covered/head down -generally no known walking/pausing  Communication: __2024: -may verbally protest -often speech not very clear -Speech very clear when Ipad not working and asking for assistance  *Rigidity/rituals/atypical behaviors/RRB: -variable, inconsistent -very rigid how she does things and how she places things -watches video clips repetitively -arranges dolls in particular fashion -set rituals __2024: -cycles of stereotyped behaviors -vocalizing and gently putting finger tips to right eye -vocalizing no no no no -periodic episodes of internal scripting __2025: Diff w/ transitions/ritualistic/rigid - house to car, nighttime routine of showers, daily routine needs - taking longer than the past - seems to be more of an issue at home than in school. Challenge in school as well. Good reports from school overall. Once out of bed quickly gets ready - coat on/hat on/on couch - looking at ipad - if encourage her to go - slows her further/needs to complete what doing on Ipad further. Showering in morning instead of night as similar issue at night with showering. More pronounced issue last few weeks. __2025: -some improvement with increase in Prozac to 50mg - transitioning more easily, transitions from screens more easily, less rigid -some increase in rocking with higher emotions   *Behavior: -zoning out more -more episodes of aggression -more scripting -less engaged -noted at home and in school -more defiance -harder to focus and redirect -Teachers reportedly note (2021): easily distracted -internal/external, scripting throughout the day, many ritualistic behaviors, if changes in routine -defiant, aggressive, property destruction, visual cues seems to help redirect her, throughout the school day, needs [employer] system, behaviors interfering her progress, working on group skills. When focused makes more progress. Academically behind. Messy handwriting. Needs a lot of repetition -Home: does laps around the house, was cooperative during recent physical, scripting, directable at times, at times gets more emotional - gets frustrated at times but short lived. Talks under her breath/in a whisper. Slow w/ morning routine, but tries following routine. __2022: -worse behavior with Vyvanse __3/ School: -still diff focusing -fewer outbursts but still present -body jerking, vocal grunting - hard to get through -jerky movements - parent hasn't generally seen -giving more rewards more frequently -more snacks for sensory, more movement breaks __2022: -some improvement over recent past __2022: -certain sequencing she needs to complete - walk, stop, walk back, go forward again -internal and external scripting - in her own world, may not always respond -during scripting may get louder, more emotional, hard to calm down -Camp: had been in an inclusion group prior years with aide for SPED campers -but this year more problematic, needs more supports-- --now in a self-contained group __2022: -some improvement w/ Intuniv 2mg __2022: -some improvement w/ Intuniv and Prozac although some continued physical resistance in school and at times at home __2023: -continued scripting -may physically indicate 'no' 'go away' -if frustrated scripting becomes louder -overall behavior has been fair/manageable __2023: -increased impulsivity - got flour everywhere in kitchen tried doing her old laundry - at times impulsivity related to her wanting to be independent -scripting: becoming more internal -calm moments, more energetic moments __2023: -continued significant challenges with 'constant motion' (waxes and wanes, was present prior to SSRI trials as well, but has been worsening), internal and external scripting interrupting her fxing and related inattention, behavioral outbursts, aggressive behaviors (more common when around her period) -Constant motion - pacing, circles room -Gets stuck scripting/yelling/stimming - with time usually calms after 20-30 minutes - AT home more manageable. At camp/school more challenging to give her time/space. -program in her head she needs to go through before can move on - like in a trance Camp: -some screaming episodes 20-30 minutes, at times aggressive - may push/swat others __2024: -overall behavior has been better than the past, although persist being very challenging *School: -no recent aggressive episodes -some ritualistic behaviors -sleep is largest issues __2024: -participating more in class -no recent aggressive behaviors -less sleeping in class - sometimes seems to be task avoidant at times __10/2024: -Becoming somewhat more aggressive, may swat peers or staff -Respite program: needs time to deescalate, may need to remove from peers when gets upset -Ipad: -stuck on having it with her all the time -in school has to work for the Ipad - perseverates (during visit if took time loading increasing became upset, yelling -'its NOT working!!!!' and repeatedly tapping it, and hard to console) __2024: -Home: diff getting Ipad away from her. Transitioning better away from Ipad. If Ipad not working well still looking for parent phone. Still some frustration, but better, settles after a few minutes. -School: Improvement in beh in school . No more swatting staff. Still motivation issues. Still occasional outbursts with frustration -no more aggressive behaviors in general -Slow going in general, poor motivation, slow going in general __2025: -some improvement -occasional outbursts - swat/push impulse - but more easily redirected and settles more quickly -some improvement in behavior in school -more easily getting her to school earlier __2025: -last few weeks no major meltdowns/behavioral issues  _: -[facility] Respite program - saying now may no longer be able to stay there, not enough support there. Parents looking now in Home RYAN to see if able to get and perhaps can go w/ her to [facility] program Tried   *Sleep: -since Dec worsening/less consistent sleep -diff settling down for sleep - wouldn't fall asleep until 1 am -some nights asleep ~9pm -Wakes: if goes to sleep later diff to wake in the morning wants to sleep until 11am, in general if goes to sleep regular time wakes around 6 -2 instances of bedwetting recently - although may have been attention seeking - was awake trying to fall asleep -Melatonin 5mg - unclear if it helps -Most nights 1-2 nights up a little later than in general __2023: -more diff going to sleep, wants to stay up and play __2023: -prior to sleep issues ?related to period perhaps - some nights asleep around 9 and some nights around 1am - worsened during period __2023: Sleep improved as more active over summer - off and on as well __2024: -bedtime routine around 9:30/10pm in bed - routine takes time as she wants to do things in her own time/way - process takes a long time. Transitions between things are very time consuming -Majority of wk not asleep until Midnight to 1-2:30AM. Most mornings have to wake her around 7am, other times she wakes on her own earlier and then takes a nap for 3 hours in school. On wknds may sleep until 10-11am. - Due to slow going in morning often parent needs to bring her to school and she gets to school late -Stopped melatonin - no major differences __2024: -likes sleeping late - sometimes impacts her getting to school on time -sleep times vary __10/2024: -hard to get up in the morning -falling asleep around MN most night, sometimes 3am, waking 9-10 am and getting to school late, hard to get up in the bed __2024: -addition of short acting guanfacine helps her fall asleep -Morning: still slow going in the morning, so getting to school late - better b/c going to sleep earlier. Not napping in school -seems tired end of school day __2024: -awake much of the night w/ the Ipad, goes to sleep late, may nap in school, Getting to school ~11am. -more disrupted sleep since ankle injury -has tried melatonin in the past - seemed helpful __2025: -has been going to sleep earlier - improved sleep - but still diff getting her out of bed in the morning - may not get to school until midday __2025: -getting up more easily and more quickly completing morning routine __2025: -getting up w/ the sun. More recently waking up more often earlier and getting to school earlier, a couple of times even took the bus. Still around once a week wakes during the night and then sleeps more during the day   *Social: -enjoys being around her peers -takes more to engage her than in the past  *Anxiousness: -when on Zoloft somewhat calmer -a little more engaged -seems more overwhelmed at times, talking in a low tone, reduced EC -seems to be more of an issue recently __2022: -some improvement with Prozac  *Staring off: (2022) In School: There have also been instances where she stares off to the side. Her eyes move to the side and remain fixed and she does not blink. Additionally, she does not respond to or track visual stimuli brought near her such as a waving hand. Home: -havent noticed recently __2022: none recent  *Skin picking: __2025: -some skin picking at scabs   Private services: -began working with  retired [employer] program - has been helpful -music therapy - stopped -Private SLT - still attends -Respite services -Home Ryan - 2025 - organizing, awaiting therapist to be assigned. Will try to transition back to after school program with RYAN.   *School input (24): We've noticed an increase in "no please!" which she uses to cover a lot of "annoying to her topics" - it's her way of saying "be quiet!", "too close", "I don't like that!" etc. She has been more willing to be redirected to a more specific statement that targets the external activity she doesn't like.  We've seen a great decrease in aggression - 1 swat on  and also she threw something on the floor. We have not seen anything else in terms of aggression in months!!!!!!!! Such great progress!  We notice a bit more of ritualistic behaviors the week before she gets her period. More singing, more "dancing in her seat" kind of thing, but nowhere near the amount she was doing in the beginning of the year. She transitions beautifully in the hallway and to classes. Biggest issue we have is the sleeping, but other than that she is an absolute delight! ***  SCHOOL HISTORY/DEVELOPMENTAL SERVICES:  Academics: -below grade level, closer to K level, starting to read, 12-15 sight words, can do basic addition -hard to engage to sit and work with her  *Report Card ([id number], Grade 1): -mostly 1s and 2s  School: [employer] Grade: 7th Services: -8:1:3 -SLT -OT -Social group -BCBA - parent training intermittently - helpful   Past school: School Name: [employer] Grade: 5th Services: *IEP -Classification: ASD -8:1:3 -SLT: individual and small group -OT -Social group -perhaps some mainstreaming -Chorus - likely w/ an aide -Behavior intervention services - BCBA involved in her programming - monitoring and tracking her behavior in class-(RYAN trained therapist/teacher in place/[facility]) -Parent training/counseling -ESY (although attended a summer camp program which she did well in ) -NO testing accommodations Comments: -demonstrates global developmental delays impacting academic progress -thrives on classroom routine -does well with rote questions -requires focusing prompts to answer questions in group -working with her on what quiet means and will incorporate times that acceptable to script and times were it isn't -responding to social greetings and farewells are emerging with visual support and models -establishing eye contact is an ongoing goal -needs consistent modeling and prompting to interact with her peers during social times  __ 23: School report: Naila arrived at school today calm. Around 8:50 we began transitioning down the hallway to go to the bus for our middle school visit. She began to engage in negative vocalizations that quickly turned into screams. She then laid on the bench in the front carl and continued to scream and aggress toward anyone that came in reach of her. We were able to get her calm enough to comply with standing and walking. We began slowly walking down the carl with many stops along the way (I will describe this behavior below). She continued to scream during the walk but was not aggressing. Once we got to the turn in the hallway, the principal joined us. She reminded Jd that she is okay and we are all here for her. Naila began to escalate in her screams and begin to aggress. Most of the aggression at this point was towards the principal but would switch to any person who tried to block or redirect her. The aggression included swats, hits, pushes and kicks. She was so escalated we needed to help her walk into the OT room to deescalate. Once there, she laid on the floor and we got her a pillow and a blanket. She was crying so hard she was no longer making sounds. We shut off the lights and positioned ourselves close enough to her, so she knew we were there to support her but far enough away to give her the space she needed to come back down. Once semi quiet, she earned the iPad and watched it for about 6 minutes. The timer then went off and she earned tokens for completing instructions. She was able to sit up, stand up, sit in a chair and walk to class where she earned the iPad again. From this time until approximately noon (about 2 hours), Naila was on edge with some screams and aggression but was able to complete some work. Unfortunately, around noon she began to escalate again. This episode took place in the classroom and she sat on top of her desk which was unsafe. We directed her into the work room in the back of the classroom where it was quiet and also where she would not disturb her classmates. At this point the magnitude of the aggression was very high. Kailyn and I were in the room with her with her token board and reinforcement. We tried a variety of methods, reinforcing moments of quiet, telling her she is safe and we are here to support her, ignoring behaviors, blocking and redirecting, as well as, showing her the reinforcement and providing almost immediate access for any type of compliance. None of these methods were successful. She aggressed continuously for almost 50 minutes. For reference, I was able to take 1 minute of frequency data and there were 46 aggressions in one minute. Aggression included hits to our bodies and faces, kicks to our bodies and wrapping her arms around our necks and slamming her body into us. At one point, we stepped out of the room to give her space and she continued to hit the walls and screaming while looking at us. She also went through a period of hugging me in a consoling way then stepping back and hitting me in the face then returning to hugging. This sequence happened 6 or 7 times. Eventually, we were able to guide her out of the room and back to her table where she was able to earn reinforcement and transition to lunch. The transition took about 26 minutes, and she did not make it to the lunchroom. Instead, in order to ensure she got to eat, we stopped in a one of the teacher's quiet small office spaces which is 165 feet from [facility]. Once inside she was able to sit calmly and eat her lunch. After lunch, iPad was terminated, and she began to work and earn on her regular schedule. It was then time to transition to chorus, she made it to approximately the OT room (similar distance as the previous transition) in about 23 minutes. It was at that point that we realized we needed to turn back or she would not make it back to class in time for dismissal. We return walking with her continuously stopping and scripting. Once we were in view of that same quiet small office space the TA pointed and said "Jd, look we are going back to where you ate lunch" and she very quickly walked straight to the room and sat down. She was then able to sit and work then complete her homework schedule. She walked to the classroom packed up and went outside with a positive affect. Kailyn spoke to mom and came back into the school. I saw people at the door and heard Jd yelling. I ran down and went outside. Kailyn notated that she was walking to the front of the building and saw her starting to engage in behavior so she went back. Upon my arrival there were 4 staff there and mom monitoring the street due to  time and her safety. Kailyn was blocking her head as she was laying on the cement. Kalina, classroom SLP, ran inside to get a bean bag to put under her head, again for safety purposes. We directed the other staff to stand out of sight but to continue to monitor the road and people walking by. She eventually stood up and with many prompts walked to the car which was parked in the back of the building. The whole process took about 45 minutes. The scripting behavior I am referring to throughout the summary is a newer behavior. It is a specific script where she stops, puts her hands to her mouth likes she is yelling and produces an unintelligible script, lifts her hands above her head as if she is yelling then on her hips like she is angry. She then spins around and looks at the floor. Sometimes she will complete this one time and continue walking/ working. Other times she has to complete this 6 or 7 times in a row before getting started.  *School note (2023): -recently demonstrating significant increase in vocalizations/involuntary movements leading to a loss of fx in school -for past 2 months these have been debilitating for Naila -do not appear behavioral or sensory in nature - sensory/behavioral plans have not been successful -behaviors occurring in all settings/all teachers, preferred and non-preferred activities, resting times = do not appear behavioral or self-stim in nature -she tries to work through these involuntary mvments to complete her work or participate in activities she enjoys and she cannot - gets frustrated if times runs out b/c wanted to finish preferred task but body isn't letting her -quick head tilts, shoulder shrugs, freezing during a mvmt, shuffling gait, screaming, vocalizations, kicking, muscle twitches - data shows occurring every 3-20 seconds -diff with FM tasks, diff eating/opening packages, cant participate in gym activities she could in the past, diff participate in new learning activities -issues since ~January  *22 Behavioral Consultant provided info Overall, there has been a decrease in the upset/"emotional" episodes that typically included aggression, property destruction and screaming. The week before she was sick there was an increase in screaming and aggression. There is the possibility that it may have been due to not feeling well ahead of testing positive. She continues to engage in high rates of self-stimulatory behaviors and non-contextual vocalizations that interrupt her ability to engage in academic tasks. These behaviors occur throughout the day and occur during leisure and preferred activities as well. They are not restricted to times of academic tasks or non-preferred demands. We continue to see non-compliance during times of academic tasks and demands. These can present as task avoidance, but also includes times when we have difficulty refocusing and redirecting her back to tasks. There are times that she appears as "stuck" within her self-stimulatory and scripting behaviors and does not appear aware of her surroundings or directions being given. When she presents as more "stuck" her motor movements appear to be brief and jerky (i.e. a quick extension and retraction of her arm out in front of her) and her vocalizations are generally more sound or grunt based versus intelligible words or tv scripts. There have also been instances where she stares off to the side. Her eyes move to the side and remain fixed and she does not blink. Additionally, she does not respond to or track visual stimuli brought near her such as a waving hand. please let up know if there's any additional information or anything we can provide more examples of or elaborate on  In past: Goes into 12:1 for a morning meeting, and mainstreamed for some activities Does well with structured routine. Fairly independent in some settings. Mainstreamed largely for socialization opportunities. Some mainstreaming with an aide to help with games, sharing and conversation - -may need prompts to respond or initiate a greeting.  : School Name: [employer] through [employer], 2 years Grade:  Services: -1st year in  - 10 students -2nd year in  - 8:1  *Behavioral Consult Progress report [facility] Autism ([id number], 2019): -improving ability to tolerate change in routine, redirect, and interrupt preferred play -working on working quietly   *[facility] Speech/Language Hearing clinic (treatment plan 6:6-6:8yo yo, 2019): -working on functional communication, play skills, Pragmatic skills, answering 'wh' questions  *Private Services: -speech therapy at [facility] intermittently -Music therapy -Havent pursued private RYAN services  *Teacher intake form (2021, 3rd grade): -Self-contained, OT, SLT -requires token economy to keep motivated and on task -seatwork: needs teacher prompt, w/o support work is illegible or incomplete -Academics: failing -Reading: = K level, Math = K level -Social: doesn't seek peers/adult relationships, will sit and parallel play but requires a lot of teacher support to interact appropriately  *Teacher intake form (3/2020): -self-contained, 7 students, multiple other staff, K-2 class, in 2nd grade -SLT -OT -Beh intervention services -highly benefits from RYAN / discrete trials -thrives with routines, follows most directions -difficulty with changes in routine, generalizing skills, constant refocusing prompts -token board for motivation and on task behavior -Social: peers from Gen ed class seek her out, her interactions with these children are adult led, most of the time she requires prompting to respond back/engage appropriately -recently developed a sense of humor -Academics: below average - 2 or more yrs below grade level -Reading: not retaining learned words or generalizing words across difft materials -Math: K level -Writing: cant spell without visuals, needs some guides to stay on the line -Seatwork: inconsistent - at times needs significant prompting - other days - still requires prompting but not as much   PREVIOUS ASSESSMENTS/SERVICES:  Early Intervention: no services  Private speech therapy around 2 years old.  Earlier evaluations: reportedly wasn't classified as having autism  CURRENT FUNCTIONING/HISTORY OF PRESENTING CONCERNS:  *********AS noted during her initial consultation****   Concerns noted on our intake paperwork include: -poor speech/communication and social skills -makes limited eye contact -bright, comprehends more than she says verbally -difficulty interacting with others- jonathan. verbally - likes to be around other children but mostly parallel plays or needs prompting to interact verbally -she understands much more receptively -she can give appropriate responses with visual and verbal cues -lacks patience and can become frustrated when things don't go her way -responds best to routines/structure  Today's concerns: -autism concerns -ADHD concerns  Concerns: -limited eye contact -difficulty socializing -repetitive movements -perseverations -some compulsive behaviors -everything has a place - need to move something back if not in its own place-table needs to be placed in an exact manner -puts toys exactly / same position and location as she found it   Language/Communication: -Shared enjoyment/joint attention: likely decreased -turns to others for help -Response to name: better now than in the past, reduced in the past -Eye contact: good with requests from her, other times reduced or needs prompts -able to follow multiple step instructions -able to speak in sentences - receptive seems stronger than her expressive language, sometimes speaks in clear sentences, other times less well developed -often scripting  Behavioral: -ADHD: a number of concerns, impulsive, highly distractible, may need multiple prompts -frustrated easily at times -improving with sharing and transitions -still issues with calming when upset, fewer meltdowns than in the past  Emotional: -anxiousness/resistance at doctor's office.  Social/Play Skills: -scripts frequently when playing -play somewhat appropriate - demonstrates pretend play with play dolls- has 2 dolls in her arms or near her often when she is home (not in school) -some appropriate play with toys at home -demonstrates some pretend play with kitchen set -difficulty taking turns - timer seems to help -some social interest in peers - on her terms -very connected to her older sister - responds well to Soheila her older sister - somewhat of a jokester and can  on jokes quicker than her older sister  Atypical Behaviors/Sensory: -hand flapping: rarely -in past: spun herself around in circles -walks around in circles/laps around the house -may watch same scene of a video repetitively - may want parent to repeat something she says in regards to the video - may repeat saying it until validated -Focus on wheels: no -no toe walking -stands close to TV -difficulties with changes in routine and transition issues -some visual peering/unusual visual regard - usually with TV -generally no visual inspection -play can often be in a set pattern - can get upset if someone wants to play in a different manner -may chew banana and then spit it out into garbage due to texture issues -tactile: sensory seeking - when watching TV may touch the screen, no major issues with clothing, likes deep hugs when upset, often makes use of sensory room in school -may repetitively watch the same show in the same way -often does 'laps' around the house - jonathan. when anticipating something -Loud noise: generally no issue  Motor Abilities: -fair  ***********   *MEDICAL HISTORY:  Current Medications: -Prozac 50mg - increased in Feb -Guanfacine ER 3mg at night - around 6:30pm - no impact on sleep -Guanfacine 1mg early evening -MPH 5mg in AM and occasionally later in the day -ON HOLD - FXR 5mg: only for a few days and a lot of other changes correlating. No major benefit. No major SE - 10/2024 - haven't retrialed -Claritin prn  Medication History: - swallows  *GIOVANNY 10 mg (2020-~2021): -10mg - unclear duration / benefit/SE -~2021 - trialed 20mg - unclear effects/SE - correlated with change in her usual routine. Seemed that worsened sleep, perhaps made her less interested in food, perhaps more scripting/rituals and more common meltdowns jonathan in afternoon around time medication thought to perhaps be wearing off --- although behavioral issues also noted on days that wasn't taking the medication so unclear if she truly failed GIOVANNY/stimulants - may trial again in future. Perhaps was quieter at times in school w/ medication  *Guanfacine 1mg (21 -2022, ~2022 restarted and later STOPPED 2022, 2024- ): -2021: 1mg BID - clear benefit noted, calmer, more cooperative, more engaged -2021: to trial small increases -2022: stopped, and shortly after increased Lexapro to 7.5mg - more active, impulsive, eating more, some more diff falling asleep -2022: restarted and later stopped -~2022-2023 - trial 1mg before bed as sleep aide, didn't help - stopped -2024: to retry as sleep aide -2024: helped with sleep 1mg at night -but parent concerned seem to correlate w/ new stim/tic like behavior and stopped, might retry -2024: benefit with sleep initiation w/ 1mg  *Zoloft 25 mg tab (2021 -10/2021 stopped): -seemed somewhat zoned out, at times somewhat sleepy in class/drift off, napped when usually didn't nap -once stopped Zoloft - more scripting, more circling around, more repeating, repetitive, scripts the same story, moving hands in set way, gets emotional at times and yells out - tab  *Concerta 18mg (~-10/2021 -for a few days and stopped, trialed 3 days - can consider trialing again at some point): -more meltdowns, on and off throughout the day, emotionally reactive/less easily redirectible -aggressive, yelling, agitated, hit, more physical -Duration: lasted most of the day, less than 12 hours  *Vyvanse 10mg (2021 -2022): -more outbursts, throughout day but mostly as wearing off -lasted a few hours, somewhat calmer -more scripting, more distracted by internal stimuli -Sleep: no impact -Diet: slight impact - less snacking  *Lexapro (2022-2022): -to trial to address outbursts, scripting, and behavioral challenges -2022: trialing 7.5mg - some decrease in internal scripting noted, seems happy -3/2022: increased to 10mg -2022: trial increase to 15mg -2022:-weaned off, as weaned off more impulsive behavior, more scripting, increased appetite, more rigid. Parents stopped as had continued with emotional outbursts - meltdowns in school, internal and external scripting. Not helpful enough and continued outbursts.   *AXR 5mg(3/2022-~2022): - unclear impact, some diff with sleep but manageable, perhaps fewer outbursts -3/31/22: trialed increase to 10mg - 'seemed off' and returned to 5mg -2022: trialed 10mg - more outbursts and scripting - stopped.   *Guanfacine ER (2022-): -2022: taking 2mg with noted benefit __2023: 3mg nightly __2024: tried going down to 2mg but worse sleep, so returned to 3mg __10/2024: recently increase to 4mg - no increase in benefit or SE __2024: returned to 3mg as 4mg wasn't any better  *Prozac 10mg (~2022-): -benefit noted -more engaged, less scripting, calmer -2023: trialed to 15mg - but noted tics and returned to 10mg. Benefit noted (discussed tics likely unrelated) -2023: parent increased to 15mg again -2023: 30mg daily -2024: reduced to 15mg - at 15mg beneficial, at 10mg insufficient benefit -2024: 15mg __10/2024: recent increase to 20mg __2024: increased to 30mg w/ some behavioral improvement   *MPH 2.5mg (~2024-): -seems to be helping her stay awake in school  *FXR 5 (2024-stopped after few days): -tried for about one wk -no major difference -perhaps scripting more   Allergies: -none  UPDATED PAST MEDICAL HISTORY: There have been no recent major medical changes.  Birth History: -born FT, 6.13lbs -regular delivery  ROS: A 10-point review of systems was performed. No concerns regarding vision and hearing. No cardiovascular, respiratory, gastrointestinal, renal, endocrine, neurologic, musculoskeletal, or dermatologic concerns.  *[place] evaluation Dr. Wilver William - Chief developmental pediatrics: worked on ways to address her behavior: reportedly impression was that Naila didn't have autism.  Summer 2020: had episode of passing out - hospitalized and evaluated by Cardiology and Neurology. Neurology considering/ordered brain MRI. No seizure activity found  *20: -Reviewed inpatient Cardiac Consultation note (hospitalized following episode of syncope) *Cardiology (2020, Vee Huff and Elida Golden): -no cardiac contraindications for starting ADHD medication  Neurology (): -EEG performed -Tic like behaviors have improved/lessoned -MRI normal  Audiology (2-3 yo, Dr. Osborn): hearing thought to be normal, MT placed  Vision: none  Hospitalizations/ Surgeries: -MT placed   FAMILY HISTORY: Her father has a MA and is an . He received Speech services when younger. Generally healthy. Her mother has a MA and is a . She received Speech services when younger. Has type II diabetes - perhaps Type 1. Thyroid issues. She has a sister (Soheila, 2007). Soheila is also followed in our center  There is an extended family history of: -ADHD, learning problems, speech delay, borderline range of intellectual functioning: sister -Bipolar: Paternal uncles, PGM -Schizoaffective: Paternal uncle, lives in a special group home/residential program, also "simple-minded" - he reportedly trialed Prozac and it made him psychotic - requiring hospitalization - improved once stopped -Some easy distraction: father -type II diabetes: maternal side of family -heart murmur: mother, father, and other family members -Fatal Cardiac arrest: uncle, was living in adult independent residence, 48 yo  There is no history of heart rhythm problems or of sudden death.  SOCIAL HISTORY: -lives with parents and sister  PE (23 in person): -well appearing -S1S2 RRR -CTA b/l -FROM of extremities -no consistent muscle rigidity, no clear cogwheel rigidity -no clonus appreciated -fair tone and strength -EOM appeared intact __10/2024: -S1S2 RRR  Observations:  (Tele 21): -calm during visit, frequent verbalizations to herself, holding her hand up with perhaps visual inspection, stopped and greeted me with prompt, pacing around the home, cooperative (Tele 3/31/22): -calmer and more cooperative appearing - mother called her over and she complied, and when prompting said hello with associated EC (in person 2023): -frequent almost barking like vocalizations, and other noises while in waiting room - less frequent but still occurring intermittently during 70 min time in room -neck twitching, shoulder to ear, leg movements, face grimacing - also myoclonic in appearance - also occurred more freq in waiting and less common when involved w/ activity in office -intermittent shuffling gait/pause when walking and then continue walking more fluidly -when walking from waiting room, in office able to walk back and forth across room when requested fluidly -mostly calm during visit and involved in playing with a bin of toys - involuntary movements and vocalizations significantly decreased for a good portion of the visit - for around ~40 minutes few if any were noted, but as visit progressed were some episodes of these movements and vocalizations - usually lasting less than 30 seconds -crayons fell on floor - she wanted to pick them up and put back on table -one toy that was missed when had put away toy bin - despite redirection she went around doctor to put back in bin - seemed driven to do so -answered some questions - yes/no when given options of what to play with -cooperative with vital signs and exam ( 2024): -social rapport not established -intermittently gently touching eye with fingers and vocalizing, intermittent repetitively saying no -self-directed   * LABORATORY/TEST RESULTS/DEVELOPMENTAL ASSESSMENTS:  ***Testing as recorded in her IEP: (2018): __Goldman Fristoe Test of Articulation 3: -Sounds in Words: 69 __Expressive One Word Picture Vocabulary Test-4 (EOWPVT-4) (SS): 14 % __Receptive One Word Picture Vocabulary Test-4 (ROWPVT-4) (SS): 90 __Preschool Language Scale-5 (SS)(SD of 15): -Auditory Comp: 67 -Expressive Comm: 64 -Total Language: 63 __ABAS (Adaptive Behavior Assessment System-III) - Teacher: -General Adaptive Composite: 1 % -Gayle Chuy Tests of Achievement IV (SS) (SD = 15): -Many scores noted as "Not scorable" -Letter Word Identification: 84 -Passage Comprehension: 45 -Reading : 65 -Broad Readin -Basic Reading Skills: 82  -Applied Problems: 51  -Spellin -Broad Written Language: 58 -Basic Writing skills: 84 -Written Expression: 65  Cognitive assessment: reportedly attempted in the past but unable to complete testing secondary to behavior.  *Psychology Eval (2.10 yr): -difficult to engage -very short attention span -looked at herself in mirror for 3 minutes -difficulty cooperating with requests -inconsistent eye contact -often ignored those around her and needed support to engage and attend -FSIQ could not be obtained, non-compliant   (3/2020) __Child and Adolescent Symptom Inventory-5(DAVID-5):  Informant: Parent -inattention:  -hyperactivity/impulsivity: 3/9 -oppositional and defiant behaviors: 0 -feels compelled to perform unusual habits: often -makes vocal sounds for no apparent reason: often -social deficits/restricted and repetitive interests/behaviors: a number endorsed No other significant symptoms of any emotional or behavioral disorder  Informant: Teacher -inattention:  -hyperactivity/impulsivity:  -oppositional and defiant behaviors:  -Very often: -feels compelled to perform unusual habits, rocks, makes vocal sounds for no apparent reason -social deficits/restricted and repetitive interests/behaviors: many endorsed No other significant symptoms of any emotional or behavioral disorder Comments: -overall pleasure to have in class, happy girl -has been presenting with more anxiety compared to last year -repetitive movements - rocking, vocalizations, scripting - and rigidity - often impede her ability to learning and retain info, as well as generalize skills -thrives on routine/structure - if routine changed - can have tantrum and be difficult to redirect - screaming, crying, some staff aggression  [place] Assessment  -Informant: Caregiver on Intake Form -Inattention:  -Hyperactivity/Impulsivity:    (2021) Guanfacine 1mg BID [place] Assessment: -Informant: Teacher -Inattention:  -Hyperactivity/Impulsivity: 3/9 -Academics: problematic.

## 2025-07-10 NOTE — PLAN
[FreeTextEntry3] : 1. Follow up is scheduled in 3 months  2. Medication:  *CURRENT: __Intuniv: Current 3 mg -continue (no increased benefit on past trial of 4mg) __Fluoxetine 50mg: Continue. If fails to wean and consider trialing alternate such as Zoloft __Short acting Guanfacine: Continue 1mg prior for sleep initiation. Can shift closer to bedtime and if needed increase to up to 2mg __MPH 5mg in AM: Current 5mg. Can continue using in morning 5mg to help her get on task in the morning. Can trial higher doses and multiple dosing per day if desired. Targeting Motivation/slow going issue. Can consider using multiple times daily if good response or retrialing XR -considering retrial FXR. If do so can decrease the MPH from 5mg to 2.5 during trial, if struggling again in morning can do MPH 5mg in morning and the FXR __FXR 5mg: To retry given good response on MPH 5mg. Monitor scripting. If unavailable trial [facility] if covered. Target: impulsive behaviors/ADHD sxs/behavioral issues - Parents haven't retried -ABILIFY - multiple times have discussed Abilify indicated and is an option. Has been evaluated by Endo already given fmhx of DMPiotr Yoo does have a hx of atypical movements which vary overtime while not on atypical antipsychotics, which may complicate monitoring for SE - although these movements have decreased significantly recently __NAC: discussed briefly option of NAC for skin picking - adding an addtl medication however may not be recommended giving the number of medications she is taking  *2/2025-Have discussed multiple times my recommendation to begin medication management with Child Psychiatry given complexity of her case and ongoing challenges. Offered direct referral to [facility] and provided info of other routes to pursue. Can also consider school related urgent beh health at [place]. Parents have opted to not to pursue as of yet.  3. OPWDD: submitting application  4. Home CHASITY: organizing  We discussed effects/SE of: - Stimulants (for her behavior and ADHD) -Alpha agonists (for behavior and ADHD) -SSRI's (for her anxiousness, rigidness, behavior, easy frustration, rituals, scripting). [email or web address] - ADHD  5. Sleep: __1/2024: -significant challenges - to trial short acting guanfacine as sleep aide -recc scheduled in Sleep clinic eval in case needed __11/2024: -discussed shifting bedtime as tolerated slowly earlier hopefully leading to waking earlier and getting to school earlier -if upcoming med changes aren't helpful Sleep medicine consult can be considered -noted FMLA may be appropriate __12/2024: -consider trialing up to 5mg melotonin -adjustments to short acting Guanfacine as above -diff at night separting from IPad to go to sleep, tends to watch videos on IPad and do other activities. Consider TV in background to replace Ipad and perhaps easier to transition away from Ipad and help her fall asleep sooner -rec sleep medicine consult - info provided __7/2025: -if sleep worsens as sun rises later overtime - perhaps consider sun-lights/winter depr lights during winter time to help her wake on time  6. Home CHASITY: __11/2024: -if needed contact for brief visit for [facility] -may be helpful, can work on sleep and behavior,  from Ipad Previous Recommendations: *Autism reccs *ADHD reccs *Social skills groups __12/2024: -dx letter provided per request as part of process looking into Home CHASITY. If needed to schedule time for [facility]  It was a pleasure to work with Naila and her family today. Please do not hesitate to contact Dr. Vasques at [phone] with any other further questions or concerns.